# Patient Record
Sex: MALE | Race: BLACK OR AFRICAN AMERICAN | NOT HISPANIC OR LATINO | Employment: OTHER | URBAN - METROPOLITAN AREA
[De-identification: names, ages, dates, MRNs, and addresses within clinical notes are randomized per-mention and may not be internally consistent; named-entity substitution may affect disease eponyms.]

---

## 2019-01-31 ENCOUNTER — HOSPITAL ENCOUNTER (EMERGENCY)
Facility: HOSPITAL | Age: 64
Discharge: HOME | End: 2019-01-31
Attending: EMERGENCY MEDICINE
Payer: MEDICARE

## 2019-01-31 VITALS
OXYGEN SATURATION: 100 % | DIASTOLIC BLOOD PRESSURE: 76 MMHG | HEIGHT: 72 IN | HEART RATE: 71 BPM | WEIGHT: 250 LBS | RESPIRATION RATE: 18 BRPM | TEMPERATURE: 98 F | SYSTOLIC BLOOD PRESSURE: 144 MMHG | BODY MASS INDEX: 33.86 KG/M2

## 2019-01-31 DIAGNOSIS — Z79.4 TYPE 2 DIABETES MELLITUS WITH HYPERGLYCEMIA, WITH LONG-TERM CURRENT USE OF INSULIN: Primary | ICD-10-CM

## 2019-01-31 DIAGNOSIS — M79.671 RIGHT FOOT PAIN: ICD-10-CM

## 2019-01-31 DIAGNOSIS — J40 BRONCHITIS: ICD-10-CM

## 2019-01-31 DIAGNOSIS — E11.65 TYPE 2 DIABETES MELLITUS WITH HYPERGLYCEMIA, WITH LONG-TERM CURRENT USE OF INSULIN: Primary | ICD-10-CM

## 2019-01-31 LAB
BILIRUBIN, POC UA: NEGATIVE
BLOOD, POC UA: ABNORMAL
CLARITY, POC UA: CLEAR
COLOR, POC UA: YELLOW
GLUCOSE, POC UA: ABNORMAL
KETONES, POC UA: ABNORMAL
LEUKOCYTE EST, POC UA: NEGATIVE
NITRITE, POC UA: NEGATIVE
PH UR STRIP: 7 [PH]
POCT GLUCOSE: 346 MG/DL (ref 70–110)
PROTEIN, POC UA: ABNORMAL
SPECIFIC GRAVITY, POC UA: 1.02
UROBILINOGEN, POC UA: 1 E.U./DL

## 2019-01-31 PROCEDURE — 99284 EMERGENCY DEPT VISIT MOD MDM: CPT | Mod: 25,ER

## 2019-01-31 RX ORDER — BENZONATATE 100 MG/1
100 CAPSULE ORAL EVERY 8 HOURS PRN
Qty: 20 CAPSULE | Refills: 0 | Status: SHIPPED | OUTPATIENT
Start: 2019-01-31

## 2019-01-31 RX ORDER — DICLOFENAC SODIUM 10 MG/G
2 GEL TOPICAL 4 TIMES DAILY PRN
Qty: 100 G | Refills: 0 | OUTPATIENT
Start: 2019-01-31 | End: 2020-10-11

## 2019-01-31 NOTE — ED PROVIDER NOTES
Encounter Date: 1/31/2019    SCRIBE #1 NOTE: I, Timmy Neville, am scribing for, and in the presence of,  Toussaint Battley, FNP. I have scribed the following portions of the note - Other sections scribed: HPI, ROS, PE.       History     Chief Complaint   Patient presents with    Foot Pain     pt c/o right sided foot pain for about 2 wks denies any injury. states that pain is on the bottom from of his foot.    Cough     pt states cough and congestion for about 2 wks as well with some fever at night.      This is a 63 y.o. male who presents to the ED with a complaint of right foot pain that began two weeks ago. Pt localizes his pain to the bottom of his right foot. Pt states that walking worsens his pain. Pt also complains of intermittent subjective fever, coughing, and nasal congestion for the past two weeks.       The history is provided by the patient. No  was used.   Foot Pain   This is a new problem. Episode onset: Two weeks ago  The problem has been gradually worsening. Pertinent negatives include no chest pain, no abdominal pain, no headaches and no shortness of breath. The symptoms are aggravated by walking. Nothing relieves the symptoms.   Cough   This is a new problem. Illness onset: Two weeks ago  The problem has been unchanged. Associated symptoms include sweats. Pertinent negatives include no chest pain, no headaches and no shortness of breath.     Review of patient's allergies indicates:   Allergen Reactions    Penicillins Rash     Past Medical History:   Diagnosis Date    Asthma     Diabetes mellitus     Hypertension     Renal disorder      Past Surgical History:   Procedure Laterality Date    CORONARY STENT PLACEMENT      gallstone removal      hemodialysis shunt right forearm       History reviewed. No pertinent family history.  Social History     Tobacco Use    Smoking status: Current Some Day Smoker     Types: Cigarettes   Substance Use Topics    Alcohol use: Yes      Comment: occasional    Drug use: No     Review of Systems   Constitutional: Positive for fever (Subjective).   HENT: Positive for congestion.    Respiratory: Positive for cough. Negative for shortness of breath.    Cardiovascular: Negative for chest pain.   Gastrointestinal: Negative for abdominal pain.   Musculoskeletal: Positive for arthralgias.   Neurological: Negative for headaches.   All other systems reviewed and are negative.      Physical Exam     Initial Vitals [01/31/19 1549]   BP Pulse Resp Temp SpO2   (!) 117/91 76 18 97.6 °F (36.4 °C) 100 %      MAP       --         Physical Exam    Nursing note and vitals reviewed.  Constitutional: He appears well-developed and well-nourished.   HENT:   Head: Normocephalic and atraumatic.   Eyes: Conjunctivae are normal.   Neck: Normal range of motion. Neck supple.   Cardiovascular: Normal rate, regular rhythm, normal heart sounds and intact distal pulses. Exam reveals no gallop and no friction rub.    No murmur heard.  Pulmonary/Chest: Breath sounds normal. No respiratory distress. He has no wheezes. He has no rhonchi. He has no rales. He exhibits no tenderness.   Musculoskeletal: Normal range of motion.   Neurological: He is alert and oriented to person, place, and time.   Skin: Skin is warm and dry.   Psychiatric: He has a normal mood and affect. His behavior is normal.         ED Course   Procedures  Labs Reviewed   POCT URINALYSIS W/O SCOPE - Abnormal; Notable for the following components:       Result Value    Glucose, UA 2+ (*)     Bilirubin, UA Negative (*)     Ketones, UA Trace (*)     Blood, UA 2+ (*)     Protein, UA 3+ (*)     Nitrite, UA Negative (*)     Leukocytes, UA Negative (*)     All other components within normal limits   POCT GLUCOSE - Abnormal; Notable for the following components:    POCT Glucose 346 (*)     All other components within normal limits   POCT URINALYSIS W/O SCOPE   POCT GLUCOSE, HAND-HELD DEVICE          Imaging Results           X-Ray Foot Complete Right (Final result)  Result time 01/31/19 16:44:09    Final result by Tyrone Joseph MD (01/31/19 16:44:09)                 Impression:      No evidence for acute fracture, bone destruction, or dislocation.    Metallic soft tissue foreign bodies identified at the plantar aspect of the base of the great toe.    Prominent calcaneal spur at the insertion of the plantar fascia.    Atherosclerosis.      Electronically signed by: Tyrone Joseph MD  Date:    01/31/2019  Time:    16:44             Narrative:    EXAMINATION:  XR FOOT COMPLETE 3 VIEW RIGHT    CLINICAL HISTORY:  . Pain in right foot    TECHNIQUE:  AP, lateral, and oblique views of the right foot were performed.    COMPARISON:  None    FINDINGS:  The bones are intact.  There is no evidence for any acute fracture or bone destruction.  There is no evidence for dislocation.  There is a prominent calcaneal spur at the insertion of the plantar fascia.  There are multiple punctate metallic soft tissue foreign bodies identified at the plantar aspect of the base of the right great toe.  Atherosclerotic calcification is identified within the arteries of the foot.                               X-Ray Chest PA And Lateral (Final result)  Result time 01/31/19 16:31:20    Final result by Rell Gallegos MD (01/31/19 16:31:20)                 Impression:      No detrimental change or radiographic acute intrathoracic process seen.      Electronically signed by: Rell Gallegos MD  Date:    01/31/2019  Time:    16:31             Narrative:    EXAMINATION:  XR CHEST PA AND LATERAL    CLINICAL HISTORY:  Bronchitis, not specified as acute or chronic    TECHNIQUE:  PA and lateral views of the chest were performed.    COMPARISON:  Chest radiograph 03/28/2009    FINDINGS:  Interval median sternotomy.  Cardiomediastinal silhouette is midline and within normal limits for age.  Pulmonary vasculature and hilar regions are within normal limits.  The lungs are  symmetrically well expanded and clear.  No pleural effusion or pneumothorax.  Osseous structures appear intact.                                 Medical Decision Making:   Initial Assessment:   Left foot pain, bronchitis, hyperglycemia  Differential Diagnosis:   Pneumonia, fracture, decay  Clinical Tests:   Lab Tests: Ordered  Radiological Study: Ordered  ED Management:  Patient stated he did not wish to be worked up for DKA but would rather go home and take his insulin as prescribed and follow up with his primary care provider.  Patient be discharged home on Voltaren gel for his foot and Tessalon Perles for his bronchitis with instructions to follow up with his primary care provider tomorrow, return to the ER as needed if symptoms worsen or fail to improve, rest, refrain from strenuous activity, and implement  RICE for foot pain.  Patient verbalized understanding of discharge instructions removed under            Scribe Attestation:   Scribe #1: I performed the above scribed service and the documentation accurately describes the services I performed. I attest to the accuracy of the note.               Clinical Impression:     1. Type 2 diabetes mellitus with hyperglycemia, with long-term current use of insulin    2. Right foot pain    3. Bronchitis                                   Toussaint Battley III, [facility]  01/31/19 7522

## 2019-05-15 ENCOUNTER — HOSPITAL ENCOUNTER (EMERGENCY)
Facility: HOSPITAL | Age: 64
Discharge: HOME | End: 2019-05-15
Attending: EMERGENCY MEDICINE
Payer: MEDICARE

## 2019-05-15 VITALS
RESPIRATION RATE: 19 BRPM | DIASTOLIC BLOOD PRESSURE: 88 MMHG | OXYGEN SATURATION: 100 % | SYSTOLIC BLOOD PRESSURE: 132 MMHG | TEMPERATURE: 98 F | WEIGHT: 250 LBS | HEART RATE: 92 BPM | BODY MASS INDEX: 33.86 KG/M2 | HEIGHT: 72 IN

## 2019-05-15 DIAGNOSIS — Z79.4 TYPE 2 DIABETES MELLITUS WITH COMPLICATION, WITH LONG-TERM CURRENT USE OF INSULIN: ICD-10-CM

## 2019-05-15 DIAGNOSIS — K59.00 CONSTIPATION, UNSPECIFIED CONSTIPATION TYPE: Primary | ICD-10-CM

## 2019-05-15 DIAGNOSIS — E11.8 TYPE 2 DIABETES MELLITUS WITH COMPLICATION, WITH LONG-TERM CURRENT USE OF INSULIN: ICD-10-CM

## 2019-05-15 DIAGNOSIS — R10.9 ABDOMINAL PAIN: ICD-10-CM

## 2019-05-15 LAB
ALBUMIN SERPL-MCNC: 3.1 G/DL (ref 3.3–5.5)
ALBUMIN SERPL-MCNC: 3.3 G/DL (ref 3.3–5.5)
ALP SERPL-CCNC: 142 U/L (ref 42–141)
ALP SERPL-CCNC: 151 U/L (ref 42–141)
BILIRUB SERPL-MCNC: 0.7 MG/DL (ref 0.2–1.6)
BILIRUB SERPL-MCNC: 0.7 MG/DL (ref 0.2–1.6)
BUN SERPL-MCNC: 24 MG/DL (ref 7–22)
CALCIUM SERPL-MCNC: 9.2 MG/DL (ref 8–10.3)
CHLORIDE SERPL-SCNC: 95 MMOL/L (ref 98–108)
CREAT SERPL-MCNC: 6.8 MG/DL (ref 0.6–1.2)
GLUCOSE SERPL-MCNC: 482 MG/DL (ref 73–118)
POC ALT (SGPT): 21 U/L (ref 10–47)
POC ALT (SGPT): 22 U/L (ref 10–47)
POC AMYLASE: 87 U/L (ref 14–97)
POC AST (SGOT): 35 U/L (ref 11–38)
POC AST (SGOT): 36 U/L (ref 11–38)
POC GGT: 164 U/L (ref 5–65)
POC TCO2: 32 MMOL/L (ref 18–33)
POCT GLUCOSE: 342 MG/DL (ref 70–110)
POCT GLUCOSE: 462 MG/DL (ref 70–110)
POCT GLUCOSE: 498 MG/DL (ref 70–110)
POTASSIUM BLD-SCNC: 3.7 MMOL/L (ref 3.6–5.1)
PROTEIN, POC: 7.3 G/DL (ref 6.4–8.1)
PROTEIN, POC: 7.3 G/DL (ref 6.4–8.1)
SODIUM BLD-SCNC: 141 MMOL/L (ref 128–145)

## 2019-05-15 PROCEDURE — 80053 COMPREHEN METABOLIC PANEL: CPT | Mod: ER

## 2019-05-15 PROCEDURE — 99284 EMERGENCY DEPT VISIT MOD MDM: CPT | Mod: 25,ER

## 2019-05-15 PROCEDURE — 96374 THER/PROPH/DIAG INJ IV PUSH: CPT | Mod: ER

## 2019-05-15 PROCEDURE — 25000003 PHARM REV CODE 250: Mod: ER | Performed by: NURSE PRACTITIONER

## 2019-05-15 PROCEDURE — 63600175 PHARM REV CODE 636 W HCPCS: Mod: ER | Performed by: NURSE PRACTITIONER

## 2019-05-15 PROCEDURE — 85025 COMPLETE CBC W/AUTO DIFF WBC: CPT | Mod: ER

## 2019-05-15 PROCEDURE — 25000003 PHARM REV CODE 250: Mod: ER | Performed by: EMERGENCY MEDICINE

## 2019-05-15 PROCEDURE — 82150 ASSAY OF AMYLASE: CPT | Mod: ER

## 2019-05-15 PROCEDURE — 96361 HYDRATE IV INFUSION ADD-ON: CPT | Mod: ER

## 2019-05-15 PROCEDURE — 96372 THER/PROPH/DIAG INJ SC/IM: CPT | Mod: ER,59

## 2019-05-15 RX ORDER — POTASSIUM CHLORIDE 20 MEQ/1
40 TABLET, EXTENDED RELEASE ORAL
Status: COMPLETED | OUTPATIENT
Start: 2019-05-15 | End: 2019-05-15

## 2019-05-15 RX ORDER — INSULIN ASPART 100 [IU]/ML
1-10 INJECTION, SOLUTION INTRAVENOUS; SUBCUTANEOUS
Status: COMPLETED | OUTPATIENT
Start: 2019-05-15 | End: 2019-05-15

## 2019-05-15 RX ADMIN — POTASSIUM CHLORIDE 40 MEQ: 1500 TABLET, EXTENDED RELEASE ORAL at 06:05

## 2019-05-15 RX ADMIN — INSULIN ASPART 10 UNITS: 100 INJECTION, SOLUTION INTRAVENOUS; SUBCUTANEOUS at 07:05

## 2019-05-15 RX ADMIN — INSULIN HUMAN 6 UNITS: 100 INJECTION, SOLUTION PARENTERAL at 08:05

## 2019-05-15 RX ADMIN — SODIUM CHLORIDE 500 ML: 0.9 INJECTION, SOLUTION INTRAVENOUS at 08:05

## 2019-05-16 NOTE — ED PROVIDER NOTES
"Encounter Date: 5/15/2019       History     Chief Complaint   Patient presents with    Abdominal Pain     abd pain, onset "weeks", denies n/v/d or constipation, denies burning with urination.  dialysis pt- m/w/f- completed tx today     A 63-year-old male with asthma, diabetes, hypertension and chronic kidney disease currently on dialysis which presents with a several month history of worsening abdominal pain. Patient states that he goes to dialysis 3 times a day and he has not discussed this abdominal pain with the providers at the dialysis center.  He has not followed up with his primary care regarding this abdominal pain either.  Patient denies any fever, nausea, vomiting or diarrhea.  Patient states that he has 1-2 bowel movements today.  He denies any hardness of the bowel movements.  Patient denies any leg swelling.    The history is provided by the patient.     Review of patient's allergies indicates:   Allergen Reactions    Penicillins Rash     Past Medical History:   Diagnosis Date    Asthma     Diabetes mellitus     Hypertension     Renal disorder      Past Surgical History:   Procedure Laterality Date    CORONARY STENT PLACEMENT      gallstone removal      hemodialysis shunt right forearm       History reviewed. No pertinent family history.  Social History     Tobacco Use    Smoking status: Current Some Day Smoker     Types: Cigarettes   Substance Use Topics    Alcohol use: Yes     Comment: occasional    Drug use: No     Review of Systems   Constitutional: Negative for chills and fever.   HENT: Negative for sore throat.    Respiratory: Negative for shortness of breath.    Cardiovascular: Negative for chest pain.   Gastrointestinal: Positive for abdominal pain (Generalized). Negative for constipation, diarrhea, nausea and vomiting.   Genitourinary: Negative for dysuria.   Musculoskeletal: Negative for back pain.   Skin: Negative for rash.   Neurological: Negative for weakness.   Hematological: " Does not bruise/bleed easily.   All other systems reviewed and are negative.    Physical Exam     Initial Vitals [05/15/19 1648]   BP Pulse Resp Temp SpO2   (!) 136/91 92 19 98.2 °F (36.8 °C) 100 %      MAP       --         Physical Exam    Nursing note and vitals reviewed.  Constitutional: He appears well-developed and well-nourished.   HENT:   Head: Normocephalic and atraumatic.   Nose: Nose normal.   Mouth/Throat: Oropharynx is clear and moist.   Eyes: Conjunctivae and EOM are normal. Pupils are equal, round, and reactive to light.   Cardiovascular: Normal rate, regular rhythm, normal heart sounds and intact distal pulses. Exam reveals no gallop and no friction rub.    No murmur heard.  No lower extremity edema noted   Pulmonary/Chest: Breath sounds normal. No respiratory distress. He has no wheezes. He has no rhonchi. He has no rales. He exhibits no tenderness.   Abdominal: Soft. Bowel sounds are normal. He exhibits distension (Protuberant abdomen consistent with obesity). He exhibits no mass. There is no tenderness. There is no rebound and no guarding.   Musculoskeletal: Normal range of motion. He exhibits no edema or tenderness.   Neurological: He is alert and oriented to person, place, and time. He has normal strength.   Skin:   Fistula noted to right forearm without complication       ED Course   Procedures  Labs Reviewed   POCT LIVER PANEL - Abnormal; Notable for the following components:       Result Value    Alkaline Phosphatase,  (*)     POC  (*)     All other components within normal limits   POCT CMP - Abnormal; Notable for the following components:    Albumin, POC 3.1 (*)     Alkaline Phosphatase,  (*)     POC BUN 24 (*)     POC Chloride 95 (*)     POC Creatinine 6.8 (*)     POC Glucose 482 (*)     All other components within normal limits   POCT GLUCOSE - Abnormal; Notable for the following components:    POCT Glucose 498 (*)     All other components within normal limits   POCT  GLUCOSE - Abnormal; Notable for the following components:    POCT Glucose 462 (*)     All other components within normal limits   POCT GLUCOSE - Abnormal; Notable for the following components:    POCT Glucose 342 (*)     All other components within normal limits   POCT CBC   POCT GLUCOSE, HAND-HELD DEVICE   POCT GLUCOSE, HAND-HELD DEVICE   POCT GLUCOSE, HAND-HELD DEVICE   POCT CMP   POCT AMYLASE          Imaging Results          X-Ray Abdomen Flat And Erect (Final result)  Result time 05/15/19 17:20:46    Final result by Rajendra Falk MD (05/15/19 17:20:46)                 Impression:      1. Nonobstructive bowel gas pattern.  Moderate stool in the colon.      Electronically signed by: Rajendra Falk MD  Date:    05/15/2019  Time:    17:20             Narrative:    EXAMINATION:  XR ABDOMEN FLAT AND ERECT    CLINICAL HISTORY:  Unspecified abdominal pain    TECHNIQUE:  Flat and erect AP views of the abdomen were performed.    COMPARISON:  None    FINDINGS:  Two upright views, 3 supine views.    No significant air-fluid levels on upright view.  Air and stool is seen within the large bowel and projected over the rectum.  No focally dilated small bowel loops.  No large volume free air or pneumatosis.  There are no calcifications to suggest nephrolithiasis or cholelithiasis.  No acute osseous abnormality.                                 Medical Decision Making:   Initial Assessment:   63-year-old male with asthma, diabetes, hypertension and chronic kidney disease currently on dialysis which presents with a several month history of worsening abdominal pain. Patient states that he goes to dialysis 3 times a day and he has not discussed this abdominal pain with the providers at the dialysis center.  He has not followed up with his primary care regarding this abdominal pain either.  Patient denies any fever, nausea, vomiting or diarrhea.  Patient states that he has 1-2 bowel movements today.  He denies any hardness of  the bowel movements.  Patient denies any leg swelling.    Differential Diagnosis:   Constipation, ascites, DKA  Clinical Tests:   Lab Tests: Ordered and Reviewed       <> Summary of Lab: Patient noted to be hyperglycemic  Radiological Study: Ordered and Reviewed  ED Management:  Patient examined and has unremarkable exam.  Patient states that the abdominal pain is very vague and does not prevent him from doing things.  Patient's x-ray is consistent with constipation.  Patient advised of these findings and advised to take MiraLax along with talking to his nephrologist about when he can take for constipation.  Patient's labs indicated a hyperglycemic state.  Patient did not initially want any IV fluids or an IV to help treat the hyperglycemia.  Patient states that he only takes his insulin once a day it appears to be noncompliant with his medication regimen.  Patient given 10 units of insulin SQ while in ED which did not decrease the blood sugar.  Patient finally agreed to get an IV, fluids and IV insulin.  Upon completing this his sugar decreased to 342 and the patient was discharged.  Patient was advised to follow up with his primary care in the next couple days for further management of his diabetes.  Patient also advised to follow a better diet.  Patient given strict return precautions and voiced understanding of all discharge instructions.  Patient stable at discharge.                   ED Course as of May 15 2147   Wed May 15, 2019   1656 BP(!): 136/91 [AT]   1656 Temp: 98.2 °F (36.8 °C) [AT]   1656 Temp src: Oral [AT]   1656 Pulse: 92 [AT]   1656 Resp: 19 [AT]   1656 SpO2: 100 % [AT]   1846 POC BUN(!): 24 [AT]   1846 POC Glucose(!!): 482 [AT]   1846 Alkaline Phosphatase, POC(!): 142 [AT]   1846 POC GGT(!): 164 [AT]   2113 Originally patient did not want an IV and fluids for his elevated blood sugar-after a couple of hours and his wife convinced him to get the IV and fluids patient agreed-patient was also given  IV insulin for which his sugar decreased to 342    [AT]   2114 POCT Glucose(!): 342 [AT]      ED Course User Index  [AT] CLINT Bustillos     Clinical Impression:       ICD-10-CM ICD-9-CM   1. Constipation, unspecified constipation type K59.00 564.00   2. Abdominal pain R10.9 789.00   3. Type 2 diabetes mellitus with complication, with long-term current use of insulin E11.8 250.90    Z79.4 V58.67                                CLINT Bustillos  05/15/19 2156

## 2019-05-16 NOTE — ED NOTES
IV discontinued per discharge order. IV catheter intact. No irritation, or phlebitis noted. No complaints voiced from pt during removal. Gauze with coban applied. Bleeding controlled on discharge.

## 2020-01-04 ENCOUNTER — HOSPITAL ENCOUNTER (EMERGENCY)
Facility: HOSPITAL | Age: 65
Discharge: HOME | End: 2020-01-04
Attending: EMERGENCY MEDICINE
Payer: MEDICAID

## 2020-01-04 VITALS
WEIGHT: 250 LBS | RESPIRATION RATE: 18 BRPM | BODY MASS INDEX: 33.86 KG/M2 | TEMPERATURE: 99 F | SYSTOLIC BLOOD PRESSURE: 141 MMHG | DIASTOLIC BLOOD PRESSURE: 74 MMHG | OXYGEN SATURATION: 100 % | HEIGHT: 72 IN | HEART RATE: 89 BPM

## 2020-01-04 DIAGNOSIS — R21 RASH: ICD-10-CM

## 2020-01-04 DIAGNOSIS — G89.29 CHRONIC PAIN OF LEFT LOWER EXTREMITY: Primary | ICD-10-CM

## 2020-01-04 DIAGNOSIS — M79.605 CHRONIC PAIN OF LEFT LOWER EXTREMITY: Primary | ICD-10-CM

## 2020-01-04 LAB
BILIRUBIN, POC UA: NEGATIVE
BLOOD, POC UA: ABNORMAL
CLARITY, POC UA: CLEAR
COLOR, POC UA: YELLOW
GLUCOSE, POC UA: NEGATIVE
KETONES, POC UA: NEGATIVE
LEUKOCYTE EST, POC UA: NEGATIVE
NITRITE, POC UA: NEGATIVE
PH UR STRIP: 5.5 [PH]
POCT GLUCOSE: 81 MG/DL (ref 70–110)
PROTEIN, POC UA: ABNORMAL
SPECIFIC GRAVITY, POC UA: 1.02
UROBILINOGEN, POC UA: 0.2 E.U./DL

## 2020-01-04 PROCEDURE — 81003 URINALYSIS AUTO W/O SCOPE: CPT | Mod: ER

## 2020-01-04 PROCEDURE — 99284 EMERGENCY DEPT VISIT MOD MDM: CPT | Mod: 25,ER

## 2020-01-04 PROCEDURE — 25000003 PHARM REV CODE 250: Mod: ER | Performed by: EMERGENCY MEDICINE

## 2020-01-04 PROCEDURE — 82962 GLUCOSE BLOOD TEST: CPT | Mod: ER

## 2020-01-04 RX ORDER — NYSTATIN 100000 U/G
CREAM TOPICAL 3 TIMES DAILY
Qty: 30 G | Refills: 0 | Status: SHIPPED | OUTPATIENT
Start: 2020-01-04 | End: 2020-01-14

## 2020-01-04 RX ORDER — ACETAMINOPHEN 500 MG
500 TABLET ORAL EVERY 6 HOURS PRN
Qty: 30 TABLET | Refills: 0 | OUTPATIENT
Start: 2020-01-04 | End: 2020-07-06

## 2020-01-04 RX ORDER — ACETAMINOPHEN 325 MG/1
650 TABLET ORAL
Status: COMPLETED | OUTPATIENT
Start: 2020-01-04 | End: 2020-01-04

## 2020-01-04 RX ORDER — CYCLOBENZAPRINE HCL 10 MG
10 TABLET ORAL 3 TIMES DAILY PRN
Qty: 15 TABLET | Refills: 0 | Status: SHIPPED | OUTPATIENT
Start: 2020-01-04 | End: 2020-01-09

## 2020-01-04 RX ADMIN — ACETAMINOPHEN 650 MG: 325 TABLET, FILM COATED ORAL at 01:01

## 2020-01-04 NOTE — ED PROVIDER NOTES
"Encounter Date: 1/4/2020    SCRIBE #1 NOTE: I, Natan Sam, am scribing for, and in the presence of,  Dr. Carmona . I have scribed the following portions of the note - Other sections scribed: HPI, ROS, PE.       History     Chief Complaint   Patient presents with    Leg Pain     left leg pain for "a while", pt report pain on the right side of the testicle, pt also have complaints of right sided dental pain.  note pt is on dialysis. access is in the right FA.     Dental Pain    Testicle Pain     64 y.o. male with chronic left sided lower leg pain years, recently worsening. Patient also reports groin and burning sensation to skin area near testicles. Denies chest pain, abdominal pain, headache, or any other symptoms at this time. Patient is on dialysis and access Is in right FA. Patient does make urine     The history is provided by the patient.     Review of patient's allergies indicates:   Allergen Reactions    Lisinopril Other (See Comments)     DIARRHEA    Penicillins Rash     Past Medical History:   Diagnosis Date    Asthma     Diabetes mellitus     Hypertension     Renal disorder      Past Surgical History:   Procedure Laterality Date    CORONARY STENT PLACEMENT      gallstone removal      hemodialysis shunt right forearm       No family history on file.  Social History     Tobacco Use    Smoking status: Current Some Day Smoker     Types: Cigarettes   Substance Use Topics    Alcohol use: Yes     Comment: occasional    Drug use: No     Review of Systems   Constitutional: Negative for fever.   Cardiovascular: Negative for chest pain.   Genitourinary: Positive for testicular pain (skin area).   Musculoskeletal: Positive for arthralgias.   Skin: Negative for rash.   Neurological: Negative for headaches.   All other systems reviewed and are negative.      Physical Exam     Initial Vitals   BP Pulse Resp Temp SpO2   01/04/20 1150 01/04/20 1134 01/04/20 1134 01/04/20 1134 01/04/20 1134   104/78 89 " 16 97.9 °F (36.6 °C) 99 %      MAP       --                Patient gave consent to have physical exam performed.    Physical Exam    Nursing note and vitals reviewed.  Constitutional: Vital signs are normal. He appears well-developed and well-nourished.   HENT:   Head: Normocephalic and atraumatic.   Right Ear: External ear normal.   Left Ear: External ear normal.   Nose: Nose normal.   Mouth/Throat: Oropharynx is clear and moist.   Eyes: EOM are normal. Pupils are equal, round, and reactive to light.   Neck: Normal range of motion. Neck supple.   Cardiovascular: Normal rate, regular rhythm and normal heart sounds. Exam reveals no gallop and no friction rub.    No murmur heard.  Pulmonary/Chest: Effort normal and breath sounds normal. No respiratory distress. He has no decreased breath sounds. He has no wheezes. He has no rhonchi. He has no rales.   Abdominal: Soft. Bowel sounds are normal. He exhibits no distension and no mass. There is no tenderness. There is no rebound and no guarding.   Musculoskeletal: Normal range of motion. He exhibits no edema or tenderness.   Pulse is palpable x4 no clubbing cyanosis or edema.  No bony tenderness appreciated on exam   Neurological: He is alert and oriented to person, place, and time. He has normal strength.   Skin: Skin is warm and dry. Capillary refill takes less than 2 seconds. Rash (Mild erythema to crease of right groin, area is very moist) noted.   Psychiatric: He has a normal mood and affect.         ED Course   Procedures  Labs Reviewed   POCT URINALYSIS W/O SCOPE - Abnormal; Notable for the following components:       Result Value    Blood, UA 3+ (*)     Protein, UA 3+ (*)     All other components within normal limits   POCT URINALYSIS W/O SCOPE   POCT GLUCOSE               Medical Decision Making:   History:   Old Medical Records: I decided to obtain old medical records.  Clinical Tests:   Lab Tests: Ordered and Reviewed  Chief complaint: Left leg pain    Differential diagnosis:  Chronic left leg pain, hyperglycemia, urinary tract infection, hypoglycemia, groin pain, and rash  Treatment in the ED; included physical exam and Tylenol  Patient reports feeling better after treatment in the ER.      Ordered POCT urinalysis, POCT glucose   Discussed treatment, prescriptions, and labs.  Fill and take prescriptions as directed.  Return to the ED if symptoms worsen or do not resolve.   Answered questions and discussed discharge plan.    Patient feels better and is ready for discharge.  Follow up with PCP/specialist in 1 day.            Scribe Attestation:   Scribe #1: I performed the above scribed service and the documentation accurately describes the services I performed. I attest to the accuracy of the note.     I, Dr. Elin Carmona, personally performed the services described in this documentation. This document was produced by a scribe under my direction and in my presence. All medical record entries made by the scribe were at my direction and in my presence.  I have reviewed the chart and agree that the record reflects my personal performance and is accurate and complete. Elin Carmona, .     01/04/2020 1:04 PM                        Clinical Impression:     1. Chronic pain of left lower extremity    2. Tal Carmona,   01/04/20 3940

## 2020-07-06 ENCOUNTER — HOSPITAL ENCOUNTER (EMERGENCY)
Facility: HOSPITAL | Age: 65
Discharge: HOME | End: 2020-07-06
Attending: EMERGENCY MEDICINE
Payer: MEDICARE

## 2020-07-06 VITALS
RESPIRATION RATE: 16 BRPM | WEIGHT: 200 LBS | HEIGHT: 72 IN | BODY MASS INDEX: 27.09 KG/M2 | DIASTOLIC BLOOD PRESSURE: 69 MMHG | SYSTOLIC BLOOD PRESSURE: 149 MMHG | OXYGEN SATURATION: 98 % | TEMPERATURE: 98 F | HEART RATE: 75 BPM

## 2020-07-06 DIAGNOSIS — Z99.2 ESRD (END STAGE RENAL DISEASE) ON DIALYSIS: Primary | ICD-10-CM

## 2020-07-06 DIAGNOSIS — R34 OLIGURIA: ICD-10-CM

## 2020-07-06 DIAGNOSIS — N18.6 ESRD (END STAGE RENAL DISEASE) ON DIALYSIS: Primary | ICD-10-CM

## 2020-07-06 LAB
ALBUMIN SERPL-MCNC: 3.3 G/DL (ref 3.3–5.5)
ALP SERPL-CCNC: 142 U/L (ref 42–141)
BILIRUB SERPL-MCNC: 0.4 MG/DL (ref 0.2–1.6)
BILIRUBIN, POC UA: NEGATIVE
BLOOD, POC UA: ABNORMAL
BUN SERPL-MCNC: 40 MG/DL (ref 7–22)
CALCIUM SERPL-MCNC: 10.2 MG/DL (ref 8–10.3)
CHLORIDE SERPL-SCNC: 99 MMOL/L (ref 98–108)
CLARITY, POC UA: CLEAR
COLOR, POC UA: YELLOW
CREAT SERPL-MCNC: 9.9 MG/DL (ref 0.6–1.2)
GLUCOSE SERPL-MCNC: 104 MG/DL (ref 73–118)
GLUCOSE, POC UA: NEGATIVE
KETONES, POC UA: NEGATIVE
LEUKOCYTE EST, POC UA: ABNORMAL
NITRITE, POC UA: NEGATIVE
PH UR STRIP: 7.5 [PH]
POC ALT (SGPT): 23 U/L (ref 10–47)
POC AST (SGOT): 31 U/L (ref 11–38)
POC TCO2: 26 MMOL/L (ref 18–33)
POTASSIUM BLD-SCNC: 4.8 MMOL/L (ref 3.6–5.1)
PROTEIN, POC UA: ABNORMAL
PROTEIN, POC: 9.2 G/DL (ref 6.4–8.1)
SODIUM BLD-SCNC: 143 MMOL/L (ref 128–145)
SPECIFIC GRAVITY, POC UA: 1.02
UROBILINOGEN, POC UA: 0.2 E.U./DL

## 2020-07-06 PROCEDURE — 81003 URINALYSIS AUTO W/O SCOPE: CPT | Mod: ER

## 2020-07-06 PROCEDURE — 99284 EMERGENCY DEPT VISIT MOD MDM: CPT | Mod: 25,ER

## 2020-07-06 PROCEDURE — 25000003 PHARM REV CODE 250: Mod: ER | Performed by: PHYSICIAN ASSISTANT

## 2020-07-06 PROCEDURE — 85025 COMPLETE CBC W/AUTO DIFF WBC: CPT | Mod: ER

## 2020-07-06 PROCEDURE — 80053 COMPREHEN METABOLIC PANEL: CPT | Mod: ER

## 2020-07-06 RX ORDER — OXYCODONE AND ACETAMINOPHEN 5; 325 MG/1; MG/1
1 TABLET ORAL
Status: COMPLETED | OUTPATIENT
Start: 2020-07-06 | End: 2020-07-06

## 2020-07-06 RX ORDER — DOXYCYCLINE 100 MG/1
100 CAPSULE ORAL EVERY 12 HOURS
Qty: 20 CAPSULE | Refills: 0 | Status: SHIPPED | OUTPATIENT
Start: 2020-07-06 | End: 2020-07-16

## 2020-07-06 RX ORDER — ACETAMINOPHEN 500 MG
500 TABLET ORAL EVERY 4 HOURS PRN
Qty: 20 TABLET | Refills: 0 | Status: SHIPPED | OUTPATIENT
Start: 2020-07-06 | End: 2020-07-11

## 2020-07-06 RX ORDER — LIDOCAINE 50 MG/G
1 PATCH TOPICAL DAILY
Qty: 15 PATCH | Refills: 0 | Status: SHIPPED | OUTPATIENT
Start: 2020-07-06 | End: 2020-07-21

## 2020-07-06 RX ADMIN — OXYCODONE HYDROCHLORIDE AND ACETAMINOPHEN 1 TABLET: 5; 325 TABLET ORAL at 04:07

## 2020-07-06 NOTE — ED TRIAGE NOTES
Pt to er c/o LUQ and L groin pain with urinary retention and dysuria--denies N/V, fever, bleeding, cough, CP, trauma, and CP--NAD

## 2020-07-06 NOTE — DISCHARGE INSTRUCTIONS
Take Tylenol as needed for pain. Put Lidoderm on your left side for pain as needed. Follow up with primary care in 2 days. Return to ER for worsening symptoms or as needed

## 2020-07-06 NOTE — ED PROVIDER NOTES
"Encounter Date: 7/6/2020    SCRIBE #1 NOTE: I, Elida Brady, am scribing for, and in the presence of,  EVA Baer. I have scribed the following portions of the note - Other sections scribed: HPI, ROS, PE.       History     Chief Complaint   Patient presents with    Groin Pain     x 1 day with difficulty urinating. "feels like I have to pee but it won't come out."     Jameson Link is a 64 y.o. male with history of Diabetes, ESRD on dialysis MWF who presents to the ED with evaluation of difficulty urinating that began this morning. Patient states he has never had similar episodes previously. He went to dialysis this morning and states he drank little water yesterday because he was going to dialysis this morning. Reports intermittent left flank pain for the past month. Review of chart shows he had CT without contrast on 6/8/2020 that was negative with no acute abnormalities. Patient has developed right-sided penile pain this morning that is worse when he attempts to urinate. Denies fever, chills, nausea, vomiting, diarrhea, abdominal pain, penile discharge or swelling, testicular pain or swelling, dysuria, hematuria, urgency, or frequency. No attempted treatment.    The history is provided by the patient. No  was used.     Review of patient's allergies indicates:   Allergen Reactions    Lisinopril Other (See Comments)     DIARRHEA    Penicillins Rash     Past Medical History:   Diagnosis Date    Asthma     Diabetes mellitus     Hypertension     Renal disorder      Past Surgical History:   Procedure Laterality Date    CORONARY STENT PLACEMENT      gallstone removal      hemodialysis shunt right forearm       No family history on file.  Social History     Tobacco Use    Smoking status: Current Some Day Smoker     Types: Cigarettes   Substance Use Topics    Alcohol use: Yes     Comment: occasional    Drug use: No     Review of Systems   Constitutional: Negative for chills and " fever.   HENT: Negative for sore throat.    Eyes: Negative for redness.   Respiratory: Negative for shortness of breath.    Cardiovascular: Negative for chest pain.   Gastrointestinal: Negative for abdominal pain, diarrhea, nausea and vomiting.   Genitourinary: Positive for difficulty urinating, flank pain (left) and penile pain (right-sided). Negative for discharge, dysuria, frequency, hematuria, penile swelling, testicular pain and urgency.   Musculoskeletal: Negative for back pain.   Skin: Negative for rash.   Neurological: Negative for dizziness, weakness, light-headedness and headaches.   Hematological: Does not bruise/bleed easily.   Psychiatric/Behavioral: Negative for confusion.   All other systems reviewed and are negative.      Physical Exam     Initial Vitals [07/06/20 1109]   BP Pulse Resp Temp SpO2   135/89 73 20 97.6 °F (36.4 °C) 100 %      MAP       --         Physical Exam    Nursing note and vitals reviewed.  Constitutional: He appears well-developed.   HENT:   Head: Normocephalic and atraumatic.   Right Ear: External ear normal.   Left Ear: External ear normal.   Nose: Nose normal.   Mouth/Throat: Oropharynx is clear and moist.   Eyes: Conjunctivae and EOM are normal. Pupils are equal, round, and reactive to light.   Neck: Normal range of motion. Neck supple.   Cardiovascular: Normal rate, regular rhythm, S1 normal, S2 normal and normal heart sounds. Exam reveals no gallop and no friction rub.    No murmur heard.  Pulmonary/Chest: Breath sounds normal. No respiratory distress. He has no wheezes. He has no rhonchi. He has no rales.   Abdominal: Soft. There is no abdominal tenderness.   Bladder Scan: 0 mL.   Genitourinary:    Genitourinary Comments:  Exam performed by Dr. Diaz chaperoned by Lizzie VELASQUEZ,  with no testicular swelling, testicular tenderness, hernia, penile discharge or penile swelling.     Musculoskeletal: Normal range of motion.   Neurological: He is alert and oriented to  person, place, and time.   Skin: Skin is warm and dry. Capillary refill takes less than 2 seconds.   Psychiatric: He has a normal mood and affect. His behavior is normal.         ED Course   Procedures  Labs Reviewed   POCT URINALYSIS W/O SCOPE - Abnormal; Notable for the following components:       Result Value    Blood, UA 3+ (*)     Protein, UA 3+ (*)     Leukocytes, UA 3+ (*)     All other components within normal limits   POCT CMP - Abnormal; Notable for the following components:    Alkaline Phosphatase,  (*)     POC BUN 40 (*)     POC Creatinine 9.9 (*)     Protein 9.2 (*)     All other components within normal limits   POCT CBC   POCT URINALYSIS W/O SCOPE   POCT CMP              Imaging Results          CT Renal Stone Study ABD Pelvis WO (Final result)  Result time 07/06/20 13:51:37    Final result by Rell Gallegos MD (07/06/20 13:51:37)                 Impression:      No acute process or CT findings identified to explain patient's symptoms flank pain on this noncontrast CT.  Specifically, no convincing evidence of radiodense nephrolithiasis or ureteral calculus, noting a punctate nonspecific cortical calcification at the right renal upper pole.    Left adrenal small lipid rich adenoma.    Coronary and systemic atherosclerosis.    Few additional findings as above.      Electronically signed by: Rell Gallegos MD  Date:    07/06/2020  Time:    13:51             Narrative:    EXAMINATION:  CT RENAL STONE STUDY ABD PELVIS WO    CLINICAL HISTORY:  Flank pain, kidney stone suspected;    TECHNIQUE:  Low dose axial images, sagittal and coronal reformations were obtained from the lung bases to the pubic symphysis.  Contrast was not administered.    COMPARISON:  Abdominal series 05/15/2009    FINDINGS:  Imaged lung bases are clear.  Base of the heart is normal in size without significant pericardial fluid noting coronary arterial calcification and evidence of suspected prior CABG.  Lower sternotomy wires  noted.    Noncontrast appearance of the liver, gallbladder, pancreas, spleen, stomach, duodenum and right adrenal gland are within normal limits.  There is a 1.1 cm low-density nodule arising from the medial limb of the left adrenal gland with Hounsfield units most consistent with a lipid rich adenoma.  No biliary ductal dilatation.    Bilateral kidneys are normal in shape and location but overall somewhat small in size suggesting mild atrophy.  No hydronephrosis or significant perinephric stranding.  There is a punctate calcification which appears to located in the posterior cortex of the right renal upper pole.  No radiodense calculus seen within the collecting systems on either side or urinary bladder.  Subcentimeter hypoattenuating cortical foci at the lower pole right kidney and upper pole left kidneys which are too small to characterize.  Ureters are nondilated.  Urinary bladder is suboptimally distended.  Prostate and seminal vesicles are within normal limits.  Pelvic phleboliths noted.    No ascites, free air or lymphadenopathy.  Moderate to advanced scattered calcific atherosclerosis of the aorta and its proximal branch vessels.  Aorta tapers normally.    Tiny fat containing umbilical hernia.  Appendix and terminal ileum are within normal limits.  Mild amount of scattered fecal material throughout the colon.  No evidence of bowel obstruction or inflammation.  No pneumatosis or portal venous gas.    Osseous structures show age-related mild degenerative change without acute or destructive process seen.                                 Medical Decision Making:   History:   Old Medical Records: I decided to obtain old medical records.  Clinical Tests:   Lab Tests: Ordered and Reviewed  ED Management:  64-year-old male with history of end-stage renal disease on dialysis Monday Wednesday Friday and diabetes presenting for oliguria /and urea that began yesterday.  Patient states he last made urine yesterday  evening.  He went to dialysis this morning states he has been unable to urinate.  He states he does not have an urge to urinate.  Bladder scan performed in the ED shows 0 mL of urine.  BUN and creatinine increased from 40 and 24 from 9.9 and 6.8.  Patient reports he is up 1 month history of intermittent left flank pain.  CT renal is negative for nephrolithiasis. Pt was able to give small amount of urine in the ED. UA remarkable for 3+ leuks. Doxycycline prescribed for UTI.     Spoke with Dr. Hampton, nephrology, who agrees with outpatient follow up. Discussed pt with Dr. Diaz who agrees with assessment and plan.             Scribe Attestation:   Scribe #1: I performed the above scribed service and the documentation accurately describes the services I performed. I attest to the accuracy of the note.    Attending Attestation:           Physician Attestation for Scribe:  Physician Attestation Statement for Scribe #1: I, Lizzie Bunch PA-C, reviewed documentation, as scribed by Elida Franco in my presence, and it is both accurate and complete.                                Clinical Impression:     1. ESRD (end stage renal disease) on dialysis    2. Oliguria                ED Disposition Condition    Discharge Stable        ED Prescriptions     Medication Sig Dispense Start Date End Date Auth. Provider    acetaminophen (TYLENOL) 500 MG tablet Take 1 tablet (500 mg total) by mouth every 4 (four) hours as needed. 20 tablet 7/6/2020 7/11/2020 Lizzie Bunch PA-C    lidocaine (LIDODERM) 5 % Place 1 patch onto the skin once daily. Remove & Discard patch within 12 hours or as directed by MD. May use 4% over the counter if not covered by insurance for 15 days 15 patch 7/6/2020 7/21/2020 Lizzie Bunch PA-C    doxycycline (VIBRAMYCIN) 100 MG Cap Take 1 capsule (100 mg total) by mouth every 12 (twelve) hours. for 10 days 20 capsule 7/6/2020 7/16/2020 Lizzie Bunch PA-C        Follow-up Information     None                                    Lizzie Bunch PA-C  07/06/20 2119       Lizzie Bunch PA-C  07/06/20 2119

## 2020-07-31 ENCOUNTER — HOSPITAL ENCOUNTER (EMERGENCY)
Facility: HOSPITAL | Age: 65
Discharge: HOME | End: 2020-07-31
Attending: EMERGENCY MEDICINE
Payer: MEDICARE

## 2020-07-31 VITALS
BODY MASS INDEX: 27.09 KG/M2 | SYSTOLIC BLOOD PRESSURE: 135 MMHG | WEIGHT: 200 LBS | OXYGEN SATURATION: 95 % | HEIGHT: 72 IN | DIASTOLIC BLOOD PRESSURE: 80 MMHG | RESPIRATION RATE: 16 BRPM | HEART RATE: 84 BPM | TEMPERATURE: 98 F

## 2020-07-31 DIAGNOSIS — R10.12 LEFT UPPER QUADRANT ABDOMINAL PAIN OF UNKNOWN ETIOLOGY: ICD-10-CM

## 2020-07-31 DIAGNOSIS — N30.00 ACUTE CYSTITIS WITHOUT HEMATURIA: Primary | ICD-10-CM

## 2020-07-31 DIAGNOSIS — N50.819 TESTICULAR PAIN: ICD-10-CM

## 2020-07-31 LAB
ALBUMIN SERPL-MCNC: 3.3 G/DL (ref 3.3–5.5)
ALLENS TEST: ABNORMAL
ALP SERPL-CCNC: 166 U/L (ref 42–141)
BILIRUB SERPL-MCNC: 0.7 MG/DL (ref 0.2–1.6)
BILIRUBIN, POC UA: ABNORMAL
BLOOD, POC UA: ABNORMAL
BUN SERPL-MCNC: 23 MG/DL (ref 7–22)
CALCIUM SERPL-MCNC: 10.6 MG/DL (ref 8–10.3)
CHLORIDE SERPL-SCNC: 100 MMOL/L (ref 98–108)
CLARITY, POC UA: ABNORMAL
COLOR, POC UA: YELLOW
CREAT SERPL-MCNC: 7.7 MG/DL (ref 0.6–1.2)
GLUCOSE SERPL-MCNC: 163 MG/DL (ref 73–118)
GLUCOSE, POC UA: NEGATIVE
HCO3 UR-SCNC: 29.3 MMOL/L (ref 24–28)
KETONES, POC UA: NEGATIVE
LDH SERPL L TO P-CCNC: 2.37 MMOL/L (ref 0.5–2.2)
LEUKOCYTE EST, POC UA: ABNORMAL
NITRITE, POC UA: NEGATIVE
PCO2 BLDA: 44.7 MMHG (ref 35–45)
PH SMN: 7.42 [PH] (ref 7.35–7.45)
PH UR STRIP: 7.5 [PH]
PO2 BLDA: 25 MMHG (ref 40–60)
POC ALT (SGPT): 28 U/L (ref 10–47)
POC AST (SGOT): 32 U/L (ref 11–38)
POC BE: 4 MMOL/L
POC CARDIAC TROPONIN I: 0.04 NG/ML
POC SATURATED O2: 46 % (ref 95–100)
POC TCO2: 27 MMOL/L (ref 18–33)
POC TCO2: 31 MMOL/L (ref 24–29)
POCT GLUCOSE: 186 MG/DL (ref 70–110)
POTASSIUM BLD-SCNC: 4.3 MMOL/L (ref 3.6–5.1)
PROTEIN, POC UA: ABNORMAL
PROTEIN, POC: 8.6 G/DL (ref 6.4–8.1)
SAMPLE: ABNORMAL
SAMPLE: NORMAL
SITE: ABNORMAL
SODIUM BLD-SCNC: 139 MMOL/L (ref 128–145)
SPECIFIC GRAVITY, POC UA: 1.02
UROBILINOGEN, POC UA: 0.2 E.U./DL

## 2020-07-31 PROCEDURE — 81003 URINALYSIS AUTO W/O SCOPE: CPT | Mod: ER

## 2020-07-31 PROCEDURE — 93010 EKG 12-LEAD: ICD-10-PCS | Mod: ,,, | Performed by: INTERNAL MEDICINE

## 2020-07-31 PROCEDURE — 25000003 PHARM REV CODE 250: Mod: ER | Performed by: NURSE PRACTITIONER

## 2020-07-31 PROCEDURE — 96372 THER/PROPH/DIAG INJ SC/IM: CPT | Mod: ER

## 2020-07-31 PROCEDURE — 84484 ASSAY OF TROPONIN QUANT: CPT | Mod: ER

## 2020-07-31 PROCEDURE — 85025 COMPLETE CBC W/AUTO DIFF WBC: CPT | Mod: ER

## 2020-07-31 PROCEDURE — 82962 GLUCOSE BLOOD TEST: CPT | Mod: ER

## 2020-07-31 PROCEDURE — 99284 EMERGENCY DEPT VISIT MOD MDM: CPT | Mod: 25,ER

## 2020-07-31 PROCEDURE — 80053 COMPREHEN METABOLIC PANEL: CPT | Mod: ER

## 2020-07-31 PROCEDURE — 63600175 PHARM REV CODE 636 W HCPCS: Mod: ER | Performed by: NURSE PRACTITIONER

## 2020-07-31 PROCEDURE — 93010 ELECTROCARDIOGRAM REPORT: CPT | Mod: ,,, | Performed by: INTERNAL MEDICINE

## 2020-07-31 PROCEDURE — 93005 ELECTROCARDIOGRAM TRACING: CPT | Mod: ER

## 2020-07-31 PROCEDURE — 82803 BLOOD GASES ANY COMBINATION: CPT | Mod: ER

## 2020-07-31 RX ORDER — MORPHINE SULFATE 4 MG/ML
6 INJECTION, SOLUTION INTRAMUSCULAR; INTRAVENOUS
Status: COMPLETED | OUTPATIENT
Start: 2020-07-31 | End: 2020-07-31

## 2020-07-31 RX ORDER — MORPHINE SULFATE 4 MG/ML
6 INJECTION, SOLUTION INTRAMUSCULAR; INTRAVENOUS
Status: DISCONTINUED | OUTPATIENT
Start: 2020-07-31 | End: 2020-07-31

## 2020-07-31 RX ORDER — CIPROFLOXACIN 500 MG/1
500 TABLET ORAL DAILY
Qty: 10 TABLET | Refills: 0 | Status: SHIPPED | OUTPATIENT
Start: 2020-07-31 | End: 2020-08-10

## 2020-07-31 RX ORDER — DICYCLOMINE HYDROCHLORIDE 20 MG/1
20 TABLET ORAL
Qty: 28 TABLET | Refills: 0 | Status: SHIPPED | OUTPATIENT
Start: 2020-07-31 | End: 2020-08-07

## 2020-07-31 RX ORDER — CIPROFLOXACIN 500 MG/1
500 TABLET ORAL EVERY 24 HOURS
Status: DISCONTINUED | OUTPATIENT
Start: 2020-07-31 | End: 2020-07-31 | Stop reason: HOSPADM

## 2020-07-31 RX ADMIN — CIPROFLOXACIN 500 MG: 500 TABLET, FILM COATED ORAL at 12:07

## 2020-07-31 RX ADMIN — MORPHINE SULFATE 6 MG: 4 INJECTION, SOLUTION INTRAMUSCULAR; INTRAVENOUS at 11:07

## 2020-07-31 NOTE — ED TRIAGE NOTES
Patient states his testicles and penis hurt. Cannot state for how long. Denies penile drainage. Testicles are tender when NP Vernen palpated them.

## 2020-07-31 NOTE — ED NOTES
"Walked pt back into room Pt stating that his penis hurts. Pt prescribed abx and stated " I need more of these because this makes it feel better." Pt denies urinary complaints. Pt instructed into gown for MD evaluation pt stated " fuck man!"   "

## 2020-07-31 NOTE — ED PROVIDER NOTES
"Encounter Date: 7/31/2020    SCRIBE #1 NOTE: I, Queta Carreon, am scribing for, and in the presence of,  Dr. Carmona. I have scribed the following portions of the note - the EKG reading. Other sections scribed: HPI, ROS.       History     Chief Complaint   Patient presents with    refused to talk with triage nurse     PT handed triage nurse a bottle of Doxycyline and stated " i need a refill." when asked why medication was prescribed pt shouted " FUCK CANT YOU READ!". Pt refising to answer questions      This patient is a 64 y.o. male with a hx of ESRD on HD presenting to the ED with LUQ abdominal pain x 1 month and testicular pain. He reports testicular pain is in both testicles, denies difficulty urinating but Endorses dysuria. Denies changes in sexual function, testicular tenderness, penile d/c or lumps, bumps or lesions. Reports his antibiotics helped relieve his testicular pain. Denies N/V, fever, chills, CP and HA. Denies having unprotected sex.    The history is provided by the patient. No  was used.     Review of patient's allergies indicates:   Allergen Reactions    Lisinopril Other (See Comments)     DIARRHEA    Penicillins Rash     Past Medical History:   Diagnosis Date    Asthma     Diabetes mellitus     Hypertension     Renal disorder      Past Surgical History:   Procedure Laterality Date    CORONARY STENT PLACEMENT      gallstone removal      hemodialysis shunt right forearm       No family history on file.  Social History     Tobacco Use    Smoking status: Current Some Day Smoker     Types: Cigarettes   Substance Use Topics    Alcohol use: Yes     Comment: occasional    Drug use: No     Review of Systems   Constitutional: Negative for appetite change, chills, diaphoresis, fatigue and fever.   HENT: Negative for congestion, ear discharge, ear pain, postnasal drip, rhinorrhea, sinus pressure, sneezing, sore throat and voice change.    Eyes: Negative for discharge, " itching and visual disturbance.   Respiratory: Negative for cough, shortness of breath and wheezing.    Cardiovascular: Negative for chest pain, palpitations and leg swelling.   Gastrointestinal: Positive for abdominal pain. Negative for nausea and vomiting.   Endocrine: Negative for polydipsia, polyphagia and polyuria.   Genitourinary: Positive for dysuria and testicular pain. Negative for difficulty urinating, discharge, frequency, hematuria, penile pain, penile swelling and urgency.   Musculoskeletal: Negative for arthralgias and myalgias.   Skin: Negative for rash and wound.   Neurological: Negative for dizziness, seizures, syncope, weakness and headaches.   Hematological: Negative for adenopathy. Does not bruise/bleed easily.   Psychiatric/Behavioral: Negative for agitation and self-injury. The patient is not nervous/anxious.    All other systems reviewed and are negative.      Physical Exam     Initial Vitals [07/31/20 1001]   BP Pulse Resp Temp SpO2   136/85 88 18 97.7 °F (36.5 °C) 99 %      MAP       --         Patient gave consent to have physical exam performed.    Physical Exam    Nursing note and vitals reviewed.  Constitutional: He appears well-developed and well-nourished. He is not diaphoretic. No distress.   HENT:   Head: Normocephalic and atraumatic.   Right Ear: External ear normal.   Left Ear: External ear normal.   Nose: Nose normal.   Eyes: Pupils are equal, round, and reactive to light. Right eye exhibits no discharge. Left eye exhibits no discharge. No scleral icterus.   Neck: Normal range of motion.   Pulmonary/Chest: No respiratory distress.   Abdominal: He exhibits no distension. There is abdominal tenderness in the left upper quadrant. Hernia confirmed negative in the right inguinal area and confirmed negative in the left inguinal area.   Genitourinary:    Testes and penis normal.   Right testis shows no mass, no swelling and no tenderness. Right testis is descended. Cremasteric reflex is  not absent on the right side. Left testis shows no mass, no swelling and no tenderness. Left testis is descended. Cremasteric reflex is not absent on the left side. Uncircumcised. No phimosis, paraphimosis, hypospadias, penile erythema or penile tenderness. No discharge found.   Musculoskeletal: Normal range of motion.   Lymphadenopathy: No inguinal adenopathy noted on the right or left side.   Neurological: He is alert and oriented to person, place, and time.   Skin: Skin is dry. Capillary refill takes less than 2 seconds.         ED Course   Procedures  Labs Reviewed   POCT URINALYSIS W/O SCOPE - Abnormal; Notable for the following components:       Result Value    Bilirubin, UA 1+ (*)     Blood, UA 3+ (*)     Protein, UA 3+ (*)     Leukocytes, UA 1+ (*)     All other components within normal limits   POCT GLUCOSE - Abnormal; Notable for the following components:    POCT Glucose 186 (*)     All other components within normal limits   ISTAT PROCEDURE - Abnormal; Notable for the following components:    POC PO2 25 (*)     POC HCO3 29.3 (*)     POC SATURATED O2 46 (*)     POC Lactate 2.37 (*)     POC TCO2 31 (*)     All other components within normal limits   POCT CMP - Abnormal; Notable for the following components:    Alkaline Phosphatase,  (*)     POC BUN 23 (*)     Calcium, POC 10.6 (*)     POC Creatinine 7.7 (*)     POC Glucose 163 (*)     Protein 8.6 (*)     All other components within normal limits   TROPONIN ISTAT   POCT CBC     EKG Readings: (Independently Interpreted)   No STEMI. Rate of 87. Sinus Rhythm. PVC appreciated. Normal Axis. Abnormal EKG. LVH appreciated. QTc normal at 452. No prior EKG for comparison. Current rate is 82.     ECG Results          EKG 12-lead (In process)  Result time 07/31/20 14:01:03    In process by Interface, Lab In [facility] (07/31/20 14:01:03)                 Narrative:    Test Reason : R10.9,    Vent. Rate : 087 BPM     Atrial Rate : 087 BPM     P-R Int : 170 ms           QRS Dur : 108 ms      QT Int : 376 ms       P-R-T Axes : 070 014 127 degrees     QTc Int : 452 ms    Sinus rhythm with occasional Premature ventricular complexes  LVH with repolarization abnormality  Cannot rule out Inferior infarct ,age undetermined  Abnormal ECG  No previous ECGs available    Referred By: [email or web address]   SELF           Confirmed By:                                 Imaging Results          US Scrotum And Testicles (Final result)  Result time 07/31/20 12:09:36    Final result by Carlyle Noriega MD (07/31/20 12:09:36)                 Impression:      Small left epididymal cysts.  No testicular masses.  Normal color Doppler flow.      Electronically signed by: Carlyle Noriega MD  Date:    07/31/2020  Time:    12:09             Narrative:    EXAMINATION:  US SCROTUM AND TESTICLES    CLINICAL HISTORY:  Testicular pain, unspecified    TECHNIQUE:  Sonography of the scrotum and testes.    COMPARISON:  None.    FINDINGS:  The testicles are symmetric in echogenicity and size.  The right testicle measures 4.3 x 2.1 x 2.3 cm.  The left testicle measures 3.9 x 2.3 x 2.6 cm.  No testicular masses.  Left epididymal body cyst measuring 0.5 cm..  Left epididymal tail cyst measuring 0.9 cm.  No varicoceles.  No hydroceles..  Symmetric doppler arterial and venous flow demonstrated to both testicles.                               CT Abdomen Pelvis  Without Contrast (Final result)  Result time 07/31/20 11:35:24    Final result by Brock Figueroa MD (07/31/20 11:35:24)                 Impression:      1. No emergent/acute abdominopelvic findings appreciated.  A few stable findings above.      Electronically signed by: Brock Figueroa  Date:    07/31/2020  Time:    11:35             Narrative:    EXAMINATION:  CT ABDOMEN PELVIS WITHOUT CONTRAST    CLINICAL HISTORY:  LUQ abdominal pain;    TECHNIQUE:  Low dose axial images, sagittal and coronal reformations were obtained from the lung bases to the pubic symphysis. IV and oral  contrast were not utilized, limiting evaluation of vascular structures as well as soft tissue in hollow viscus organs.    COMPARISON:  CT abdomen/pelvis 07/06/2020    FINDINGS:  Heart: Heart is not enlarged. No significant pericardial thickening in the field of view.    Lung Bases: Imaged portions of the lung bases are clear.    Hepatobiliary: Normal in size, attenuation and morphology without appreciable surface nodularity. Punctate hepatic segment 8 hypodensity is too small to accurately characterize.  Otherwise, no sizable suspicious mass on this limited non-enhanced exam.  Gallbladder is grossly unremarkable without radiodense stones, mural thickening, pericholecystic fluid or pericholecystic fat stranding.  No biliary dilatation.    Pancreas: Pancreas is normal in size and morphology without appreciable surrounding fat stranding, sizable fluid or sizable suspicious mass on this partially limited non-enhanced exam.    Spleen: Spleen is normal in size and morphology without appreciable sizable parenchymal mass on this non-enhanced exam.    Adrenals: 1.5-cm adrenal adenoma in the medial limb of the left adrenal gland, unchanged.  Right adrenal gland is grossly unremarkable.    Kidneys: Kidneys are normal in location, size, attenuation and morphology. No obstructing nephrolith or hydroureteronephrosis bilaterally.  1.2-cm simple appearing cortical cyst in the left superior pole, not significantly changed.  At least 3 additional (1 right and 2 left) subcentimeter hypodense nodules are too small to accurately characterize but grossly unchanged.  Otherwise, no appreciable sizable suspicious mass on this limited non-enhanced exam.    Bladder: Urinary bladder is completely collapsed, limiting evaluation.  Given this limitation, there appears to be a significant fat stranding surrounding the entirety of the urinary bladder.    GI Tract: The unenhanced and unopacified stomach, small bowel and colon are normal in course  and caliber without evidence of bowel obstruction. No significant fat stranding. No free air. No free fluid. Appendix is normal.    Mesentery: Grossly unremarkable without free fluid or sizable suspicious mass on this non-enhanced exam.    Retroperitoneum: Aorta is normal in course and caliber without evidence of aneurysmal degeneration.  No sizable suspicious retroperitoneal mass, fluid collection or lymphadenopathy.    Pelvis: Reproductive organs are grossly unremarkable.  No appreciable sizable pelvic mass on this partially limited nonenhanced exam.  No appreciable free fluid or organized/drainable fluid collection.  No pelvic lymphadenopathy.    Soft tissues: Imaged/visualized soft tissues are grossly unremarkable.    Osseous structures: Minimal multilevel degenerative changes of the imaged spine.  No acute displaced fracture, dislocation or suspicious lytic/blastic osseous lesions.                                 Medical Decision Making:   History:   Old Medical Records: I decided to obtain old medical records.  Independently Interpreted Test(s):   I have ordered and independently interpreted EKG Reading(s) - see prior notes  Clinical Tests:   Lab Tests: Ordered and Reviewed  Radiological Study: Reviewed and Ordered  Medical Tests: Ordered and Reviewed  Chief complaint: LUQ pain and testicular pain  Differential diagnosis:    Treatment in the ED: PE, morphine  Patient reports feeling better after treatment in the ER.      Discussed treatment, prescriptions, labs, and imaging results.    Discharge home with Cipro, Bentyl   Fill and take prescriptions as directed.  Return to the ED if symptoms worsen or do not resolve.   Answered questions and discussed discharge plan.    Patient feels better and is ready for discharge.  Follow up with PCP/specialist in 1 day.       APC / Resident Notes:   Patient is a 64-year-old male who presents with left upper quadrant abdominal pain and bilateral testicle pain.  On physical  exam patient is afebrile and nontoxic in no apparent distress.  His abdomen is soft nontender x3 quadrants but there is tenderness in the left upper quadrant.  There is also no tenderness of the bilateral testicles although he reports that they are painful.  There is no color change and no deficiency of the cremasteric reflex.  Differential diagnosis includes pancreatitis, gastritis, hepatitis, testicular torsion, hydrocele, varicocele.  I have ordered appropriate labs and imaging, as well as meds to palliate the patient's pain.       Scribe Attestation:   Scribe #1: I performed the above scribed service and the documentation accurately describes the services I performed. I attest to the accuracy of the note.    --ANGY Walsh DNP ACNP-BC FNP-C         ED Course as of Jul 31 2031 Fri Jul 31, 2020   1116 EKG 87, sr with pvc's, no stemi.  Signed by GINGER Carmona.    [VC]   1118 POCT Glucose(!): 186 [VC]   1118  exam chaperoned by JUAN ANTONIO Esquivel.  Pt was apologetic and tearful.  Abd tender in left upper quad.   exam normal.    [VC]   1213 Increased alk phos, esrd on hd, mild hyperglycemia, increased protein.  This is in line with the patient's previous results.   POCT CMP(!) [VC]   1214 POC Cardiac Troponin I: 0.04 [VC]   1214 Possible uti secondary as blood protein and leuks are present.  Will treat and culture.   POCT URINALYSIS W/O SCOPE(!) [VC]   1214 Lactic is just over normal, I do not suspect sepsis.   POC Lactate(!): 2.37 [VC]   1216 CBC: leukocyte count was increased, the H&H was normal. The platelet count was normal.        [VC]   1217 Small left epididymal cysts.  No testicular masses.  Normal color Doppler flow.      US Scrotum And Testicles [VC]   1217 1. No emergent/acute abdominopelvic findings appreciated.  A few stable findings above.      CT Abdomen Pelvis  Without Contrast [VC]      ED Course User Index  [VC] Mejia Walsh DNP     The patient is discharged home in good condition to follow-up with  referrals provided and should return for any worsening or changes in condition.See above for analyses of radiology, labs, and events during pt's visit and direct actions taken. Symptomatic therapies and return precautions on AVS.   Medication choices were made after reviewing allergies, medications, history, available laboratories. See below for discharge prescriptions if any and disposition.             Clinical Impression:     1. Acute cystitis without hematuria    2. Testicular pain    3. Left upper quadrant abdominal pain of unknown etiology            Disposition:   Disposition: Discharged  Condition: Stable     ED Disposition Condition    Discharge Stable        ED Prescriptions     Medication Sig Dispense Start Date End Date Auth. Provider    ciprofloxacin HCl (CIPRO) 500 MG tablet Take 1 tablet (500 mg total) by mouth once daily. for 10 days 10 tablet 7/31/2020 8/10/2020 Mejia Walsh DNP    dicyclomine (BENTYL) 20 mg tablet Take 1 tablet (20 mg total) by mouth 4 (four) times daily before meals and nightly. for 7 days 28 tablet 7/31/2020 8/7/2020 Mejia Walsh DNP        Follow-up Information     Follow up With Specialties Details Why Contact Info    Yulisa Wells MD Urology Schedule an appointment as soon as possible for a visit  for scrotal pain [address]  [address]  [facility] [address]  [phone]      [facility] Locations Gastroenterology Schedule an appointment as soon as possible for a visit  for left upper quadrant abd pain [address]  [address]  [place] [id number] [phone]

## 2020-07-31 NOTE — DISCHARGE INSTRUCTIONS
You have been given a medication that may cause drowsiness, do not drive or operate heavy machinery with this medication. Take antibiotics as ordered. Return to the Emergency department for any worsening or failure to improve, otherwise follow up with your primary care provider.

## 2020-08-03 ENCOUNTER — HOSPITAL ENCOUNTER (EMERGENCY)
Facility: HOSPITAL | Age: 65
Discharge: HOME | End: 2020-08-03
Attending: EMERGENCY MEDICINE
Payer: MEDICARE

## 2020-08-03 VITALS
SYSTOLIC BLOOD PRESSURE: 111 MMHG | OXYGEN SATURATION: 99 % | WEIGHT: 200 LBS | DIASTOLIC BLOOD PRESSURE: 66 MMHG | HEART RATE: 72 BPM | BODY MASS INDEX: 27.09 KG/M2 | TEMPERATURE: 98 F | HEIGHT: 72 IN | RESPIRATION RATE: 18 BRPM

## 2020-08-03 DIAGNOSIS — R31.9 HEMATURIA, UNSPECIFIED TYPE: ICD-10-CM

## 2020-08-03 DIAGNOSIS — R82.998 LEUKOCYTES IN URINE: ICD-10-CM

## 2020-08-03 DIAGNOSIS — R10.9 LEFT FLANK PAIN: Primary | ICD-10-CM

## 2020-08-03 DIAGNOSIS — N18.6 ESRD (END STAGE RENAL DISEASE) ON DIALYSIS: ICD-10-CM

## 2020-08-03 DIAGNOSIS — Z99.2 ESRD (END STAGE RENAL DISEASE) ON DIALYSIS: ICD-10-CM

## 2020-08-03 LAB
BILIRUBIN, POC UA: NEGATIVE
BLOOD, POC UA: ABNORMAL
CLARITY, POC UA: CLEAR
COLOR, POC UA: YELLOW
GLUCOSE, POC UA: NEGATIVE
KETONES, POC UA: NEGATIVE
LEUKOCYTE EST, POC UA: ABNORMAL
NITRITE, POC UA: NEGATIVE
PH UR STRIP: 8.5 [PH]
PROTEIN, POC UA: ABNORMAL
SPECIFIC GRAVITY, POC UA: 1.02
UROBILINOGEN, POC UA: 0.2 E.U./DL

## 2020-08-03 PROCEDURE — 85025 COMPLETE CBC W/AUTO DIFF WBC: CPT | Mod: ER

## 2020-08-03 PROCEDURE — 87086 URINE CULTURE/COLONY COUNT: CPT

## 2020-08-03 PROCEDURE — 80053 COMPREHEN METABOLIC PANEL: CPT | Mod: ER

## 2020-08-03 PROCEDURE — 25000003 PHARM REV CODE 250: Mod: ER | Performed by: PHYSICIAN ASSISTANT

## 2020-08-03 PROCEDURE — 81003 URINALYSIS AUTO W/O SCOPE: CPT | Mod: ER

## 2020-08-03 PROCEDURE — 99284 EMERGENCY DEPT VISIT MOD MDM: CPT | Mod: 25,ER

## 2020-08-03 RX ORDER — ACETAMINOPHEN 500 MG
1000 TABLET ORAL
Status: COMPLETED | OUTPATIENT
Start: 2020-08-03 | End: 2020-08-03

## 2020-08-03 RX ORDER — HYDROCODONE BITARTRATE AND ACETAMINOPHEN 5; 325 MG/1; MG/1
1 TABLET ORAL
Status: COMPLETED | OUTPATIENT
Start: 2020-08-03 | End: 2020-08-03

## 2020-08-03 RX ORDER — CYCLOBENZAPRINE HCL 10 MG
10 TABLET ORAL 3 TIMES DAILY PRN
Qty: 15 TABLET | Refills: 0 | Status: SHIPPED | OUTPATIENT
Start: 2020-08-03 | End: 2020-08-08

## 2020-08-03 RX ORDER — LIDOCAINE 50 MG/G
1 PATCH TOPICAL DAILY
Qty: 6 PATCH | Refills: 0 | Status: SHIPPED | OUTPATIENT
Start: 2020-08-03

## 2020-08-03 RX ORDER — ORPHENADRINE CITRATE 100 MG/1
100 TABLET, EXTENDED RELEASE ORAL ONCE
Status: COMPLETED | OUTPATIENT
Start: 2020-08-03 | End: 2020-08-03

## 2020-08-03 RX ORDER — HYDROCODONE BITARTRATE AND ACETAMINOPHEN 5; 325 MG/1; MG/1
1 TABLET ORAL EVERY 6 HOURS PRN
Qty: 12 TABLET | Refills: 0 | Status: SHIPPED | OUTPATIENT
Start: 2020-08-03 | End: 2020-08-06

## 2020-08-03 RX ADMIN — ACETAMINOPHEN 1000 MG: 500 TABLET ORAL at 01:08

## 2020-08-03 RX ADMIN — ORPHENADRINE CITRATE 100 MG: 100 TABLET, EXTENDED RELEASE ORAL at 01:08

## 2020-08-03 RX ADMIN — HYDROCODONE BITARTRATE AND ACETAMINOPHEN 1 TABLET: 5; 325 TABLET ORAL at 03:08

## 2020-08-03 NOTE — ED PROVIDER NOTES
Encounter Date: 8/3/2020    SCRIBE #1 NOTE: I, Tana Sandoval, am scribing for, and in the presence of,  Alistair VELASQUEZ. I have scribed the following portions of the note - Other sections scribed: HPI, ROS, PE.       History     Chief Complaint   Patient presents with    penile complaint     pt presents to ED with c/o bleeding from penis with pain to penile shaft and testicles. Started Friday and has not gotten any better, was seen at this facility on friday for same complaint     This is a 64 y.o male with ESRD, DM, and HTN who presents to the ED complaining of dysuria and hematuria x 1 month. He was seen at this ED for this complaint on 7/31 and was treated with Cipro and he reports finishing the prescription. He denies N/V/D, fever, penile pain, increased urinary frequency, abdominal pain, or testicular pain. He notes chronic mild cough and rhinorrhea.   Patient is also complaining of left flank pain x 1 month. His pain is exacerbated with movement. He denies SOB or chest pain.   Patient is a poor historian. Patient is currently on dialysis.     The history is provided by the patient. No  was used.     Review of patient's allergies indicates:   Allergen Reactions    Lisinopril Other (See Comments)     DIARRHEA    Penicillins Rash     Past Medical History:   Diagnosis Date    Asthma     Diabetes mellitus     Hypertension     Renal disorder      Past Surgical History:   Procedure Laterality Date    CORONARY STENT PLACEMENT      gallstone removal      hemodialysis shunt right forearm       No family history on file.  Social History     Tobacco Use    Smoking status: Current Some Day Smoker     Types: Cigarettes   Substance Use Topics    Alcohol use: Yes     Comment: occasional    Drug use: No     Review of Systems   Constitutional: Negative for fever.   HENT: Positive for rhinorrhea.    Respiratory: Positive for cough. Negative for shortness of breath.    Cardiovascular: Negative  for chest pain.   Gastrointestinal: Negative for abdominal pain, diarrhea, nausea and vomiting.   Genitourinary: Positive for dysuria, flank pain and hematuria. Negative for frequency, penile pain and testicular pain.   All other systems reviewed and are negative.      Physical Exam     Initial Vitals [08/03/20 1057]   BP Pulse Resp Temp SpO2   (!) 149/106 60 20 98.2 °F (36.8 °C) 99 %      MAP       --         Physical Exam    Nursing note and vitals reviewed.  Constitutional: He appears well-developed and well-nourished. He is not diaphoretic. No distress.   HENT:   Head: Normocephalic and atraumatic.   Eyes: EOM are normal.   Neck: Normal range of motion. Neck supple.   Pulmonary/Chest: No respiratory distress.   Abdominal: Soft. Normal appearance. He exhibits no distension. There is no abdominal tenderness. There is no rigidity, no rebound, no guarding and no CVA tenderness.   Musculoskeletal: Normal range of motion.      Comments: TTP to the left mid flank intercostal musculature.  No gross deformity, bruising, erythema, or vesicular lesions. No CVA tenderness.  Tenderness does not extend into the abdomen.   Neurological: He is alert and oriented to person, place, and time.   Skin: Skin is warm and dry. Capillary refill takes less than 2 seconds.   Psychiatric: He has a normal mood and affect. His behavior is normal.         ED Course   Procedures  Labs Reviewed   POCT URINALYSIS W/O SCOPE - Abnormal; Notable for the following components:       Result Value    Blood, UA 2+ (*)     Protein, UA 3+ (*)     Leukocytes, UA 1+ (*)     All other components within normal limits   C. TRACHOMATIS/N. GONORRHOEAE BY AMP DNA   CULTURE, URINE   POCT URINALYSIS W/O SCOPE   POCT CBC   POCT CMP          Imaging Results          X-Ray Chest PA And Lateral (Final result)  Result time 08/03/20 12:57:15    Final result by Brock Figueroa MD (08/03/20 12:57:15)                 Impression:      1. No acute cardiopulmonary process  appreciated.      Electronically signed by: Brock Figueroa  Date:    08/03/2020  Time:    12:57             Narrative:    EXAMINATION:  XR CHEST PA AND LATERAL    CLINICAL HISTORY:  Unspecified abdominal pain    TECHNIQUE:  PA and lateral views of the chest were performed.    COMPARISON:  Chest radiograph 01/31/2019    FINDINGS:  Postsurgical changes compatible with prior CABG.  Otherwise, cardiomediastinal silhouette is within normal limits.    No focal consolidation, overt interstitial edema, sizable pleural effusion or pneumothorax.    Multilevel degenerative changes of the imaged spine.                                 Medical Decision Making:   History:   Old Medical Records: I decided to obtain old medical records.  Clinical Tests:   Lab Tests: Ordered and Reviewed  Radiological Study: Ordered and Reviewed  ED Management:  I am unsure of the etiology of this patient's symptoms, however, I do not believe they are of emergent/life threatening etiology at this time. Symptoms present for at least a month per patient; may be longer but patient isn't entirely sure and is a poor historian.  Flank pain does seem muscular in nature.  There is no overlying cellulitis or shingles.  Recent CT of abdomen/pelvis for same symptoms shows no ureteral stone.  Not consistent with pyelonephritis.  There is no urosepsis.  Patient is once again noted to have leukocytes in his urine.  Unfortunately, urine culture has not previously been obtained for this patient and it is not clear if this is a contaminant versus true infection.  Will send urine culture today and patient verifies that he has a valid phone number on file for callback if indicated. Not enough urine for GC and patient does not want to attempt to provide more urine.  Unable to obtain renal function today and the patient refuses further blood draws in the ED for investigation.  He is compliant with hemodialysis.  No lower lobe pneumonia today.  Recent ultrasound of  testicle shows no acute findings.  He is not anemic.  His pain is controlled.  Advising follow-up with urology; referral placed. Would benefit from cystoscopy as an outpatient. Neoplastic process is a consideration. Also advising follow-up with his nephrologist.  Strict return precautions discussed with patient who is agreeable to the plan.            Scribe Attestation:   Scribe #1: I performed the above scribed service and the documentation accurately describes the services I performed. I attest to the accuracy of the note.    Scribe attestation: I, Alistair Evans, personally performed the services described in this documentation. All medical record entries made by the scribe were at my direction and in my presence.  I have reviewed the chart and agree that the record reflects my personal performance and is accurate and complete                       Clinical Impression:     1. Left flank pain    2. Leukocytes in urine    3. Hematuria, unspecified type    4. ESRD (end stage renal disease) on dialysis                ED Disposition Condition    Discharge Stable        ED Prescriptions     Medication Sig Dispense Start Date End Date Auth. Provider    HYDROcodone-acetaminophen (NORCO) 5-325 mg per tablet Take 1 tablet by mouth every 6 (six) hours as needed for Pain. 12 tablet 8/3/2020 8/6/2020 Alistair Evans PA-C    cyclobenzaprine (FLEXERIL) 10 MG tablet Take 1 tablet (10 mg total) by mouth 3 (three) times daily as needed for Muscle spasms. 15 tablet 8/3/2020 8/8/2020 Alistair Evans PA-C    lidocaine (LIDODERM) 5 % Place 1 patch onto the skin once daily. Remove & Discard patch within 12 hours or as directed by MD. May use 4% formulation if more affordable for patient. 6 patch 8/3/2020  Alistair Evans PA-C        Follow-up Information     Follow up With Specialties Details Why Contact Info Additional Information    [facility] [place]  Schedule an appointment as soon as possible for a visit in 1 day  For reevaluation, AND to establish primary care [address] [place] [address]  [phone]       [facility] Urology Schedule an appointment as soon as possible for a visit in 1 day For further evaluation, For more definitive management of your symptoms [address]  [facility] [address] [phone] [address] [place] [address] Surgical Oncology, Orthopedic Surgery, Genetics, Physical Medicine and Rehabilitation, Occupational Therapy, Radiology Go in 1 day as an alternative to specialist evaluation [address] [place] [address]  [phone]       [facility] Emergency Medicine Go to  If symptoms worsen [address] [facility] [address] [phone]

## 2020-08-03 NOTE — PROVIDER PROGRESS NOTES - EMERGENCY DEPT.
Emergency Department TeleTRIAGE Encounter Note      CHIEF COMPLAINT    Chief Complaint   Patient presents with    penile complaint     pt presents to ED with c/o bleeding from penis with pain to penile shaft and testicles. Started Friday and has not gotten any better, was seen at this facility on friday for same complaint       VITAL SIGNS   Initial Vitals [08/03/20 1057]   BP Pulse Resp Temp SpO2   (!) 149/106 60 20 98.2 °F (36.8 °C) 99 %      MAP       --            ALLERGIES    Review of patient's allergies indicates:   Allergen Reactions    Lisinopril Other (See Comments)     DIARRHEA    Penicillins Rash       PROVIDER TRIAGE NOTE  This is a teletriage evaluation of a 64 y.o. male presenting to the ED with c/o penile pain and testicular pain with hematuria. Seen in this ED 7/31 with US, CT, and Labs. Dx: Cystitis with hematuria. Initial orders will be placed and care will be transferred to an alternate provider when patient is roomed for a full evaluation. Any additional orders and the final disposition will be determined by that provider.         ORDERS  Labs Reviewed   POCT URINALYSIS W/O SCOPE       ED Orders (720h ago, onward)    Start Ordered     Status Ordering Provider    08/03/20 1111 08/03/20 1110  POCT CBC  Once      Ordered BERNIE LAUREN    08/03/20 1111 08/03/20 1110  POCT CMP  Once      Ordered BERNIE LUAREN    08/03/20 1101 08/03/20 1100  POCT URINALYSIS W/O SCOPE  Once      Ordered WILLIAM KESSLER            Virtual Visit Note: The provider triage portion of this emergency department evaluation and documentation was performed via [employer], a HIPAA-compliant telemedicine application, in concert with a tele-presenter in the room. A face to face patient evaluation with one of my colleagues will occur once the patient is placed in an emergency department room.      DISCLAIMER: This note was prepared with [employer] voice recognition transcription software. Garbled syntax, mangled pronouns, and  other bizarre constructions may be attributed to that software system.

## 2020-08-05 LAB — BACTERIA UR CULT: NORMAL

## 2020-09-30 ENCOUNTER — HOSPITAL ENCOUNTER (EMERGENCY)
Facility: HOSPITAL | Age: 65
Discharge: HOME | End: 2020-09-30
Attending: EMERGENCY MEDICINE
Payer: MEDICARE

## 2020-09-30 VITALS
TEMPERATURE: 98 F | BODY MASS INDEX: 27.09 KG/M2 | SYSTOLIC BLOOD PRESSURE: 160 MMHG | DIASTOLIC BLOOD PRESSURE: 113 MMHG | HEART RATE: 62 BPM | OXYGEN SATURATION: 100 % | WEIGHT: 200 LBS | HEIGHT: 72 IN | RESPIRATION RATE: 20 BRPM

## 2020-09-30 DIAGNOSIS — M79.2 NEUROPATHIC PAIN OF FOOT, UNSPECIFIED LATERALITY: Primary | ICD-10-CM

## 2020-09-30 DIAGNOSIS — M79.672 FOOT PAIN, LEFT: ICD-10-CM

## 2020-09-30 DIAGNOSIS — M79.671 FOOT PAIN, RIGHT: ICD-10-CM

## 2020-09-30 DIAGNOSIS — L97.511 SKIN ULCER OF TOE OF RIGHT FOOT, LIMITED TO BREAKDOWN OF SKIN: ICD-10-CM

## 2020-09-30 LAB — POCT GLUCOSE: 196 MG/DL (ref 70–110)

## 2020-09-30 PROCEDURE — 25000003 PHARM REV CODE 250: Mod: ER | Performed by: PHYSICIAN ASSISTANT

## 2020-09-30 PROCEDURE — 82962 GLUCOSE BLOOD TEST: CPT | Mod: ER

## 2020-09-30 PROCEDURE — 99284 EMERGENCY DEPT VISIT MOD MDM: CPT | Mod: 25,ER

## 2020-09-30 RX ORDER — GABAPENTIN 100 MG/1
100 CAPSULE ORAL
Qty: 12 CAPSULE | Refills: 0 | Status: SHIPPED | OUTPATIENT
Start: 2020-09-30 | End: 2021-09-30

## 2020-09-30 RX ORDER — HYDROCODONE BITARTRATE AND ACETAMINOPHEN 5; 325 MG/1; MG/1
1 TABLET ORAL EVERY 6 HOURS PRN
Qty: 8 TABLET | Refills: 0 | Status: SHIPPED | OUTPATIENT
Start: 2020-09-30

## 2020-09-30 RX ORDER — HYDROCODONE BITARTRATE AND ACETAMINOPHEN 5; 325 MG/1; MG/1
1 TABLET ORAL
Status: COMPLETED | OUTPATIENT
Start: 2020-09-30 | End: 2020-09-30

## 2020-09-30 RX ADMIN — HYDROCODONE BITARTRATE AND ACETAMINOPHEN 1 TABLET: 5; 325 TABLET ORAL at 12:09
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
## 2020-09-30 NOTE — FIRST PROVIDER EVALUATION
Emergency Department TeleTriage Encounter Note      CHIEF COMPLAINT    Chief Complaint   Patient presents with    Foot Pain     pt presents to ED with c/o bilateral foot pain that started for a couple of days, sharp in nature       VITAL SIGNS   Initial Vitals [09/30/20 1109]   BP Pulse Resp Temp SpO2   -- 62 16 98.1 °F (36.7 °C) 100 %      MAP       --            ALLERGIES    Review of patient's allergies indicates:   Allergen Reactions    Lisinopril Other (See Comments)     DIARRHEA    Penicillins Rash       PROVIDER TRIAGE NOTE  This is a teletriage evaluation of a 64 y.o. male presenting to the ED with c/o bilateral foot pain. No injuries or trauma. No wounds. Initial orders will be placed and care will be transferred to an alternate provider when patient is roomed for a full evaluation. Any additional orders and the final disposition will be determined by that provider.         ORDERS  Labs Reviewed   POCT GLUCOSE - Abnormal; Notable for the following components:       Result Value    POCT Glucose 196 (*)     All other components within normal limits       ED Orders (720h ago, onward)    Start Ordered     Status Ordering Provider    09/30/20 1125 09/30/20 1124  X-Ray Foot Complete Left  1 time imaging      Ordered BERNIE LAUREN    09/30/20 1125 09/30/20 1124  X-Ray Foot Complete Right  1 time imaging      Ordered BERNIE LAUREN    09/30/20 1121 09/30/20 1121  POCT glucose  Once  Completed    Final result EMERGENCY, DEPT PHYSICIAN            Virtual Visit Note: The provider triage portion of this emergency department evaluation and documentation was performed via [employer], a HIPAA-compliant telemedicine application, in concert with a tele-presenter in the room. A face to face patient evaluation with one of my colleagues will occur once the patient is placed in an emergency department room.      DISCLAIMER: This note was prepared with [employer]????[employer] voice recognition transcription software. Garbled syntax,  mangled pronouns, and other bizarre constructions may be attributed to that software system.

## 2020-09-30 NOTE — ED TRIAGE NOTES
While in triage, unable to obtain blood pressure with machine. Pt is AAOx4 and NAD noted. Attempted to obtain manual blood pressure on left upper extremity without success. Cuff applied to left lower extremity and monitor could not read. Reported to EVA Delarosa.

## 2020-10-11 ENCOUNTER — HOSPITAL ENCOUNTER (EMERGENCY)
Facility: HOSPITAL | Age: 65
Discharge: HOME | End: 2020-10-11
Attending: EMERGENCY MEDICINE
Payer: MEDICARE

## 2020-10-11 VITALS
HEIGHT: 72 IN | DIASTOLIC BLOOD PRESSURE: 55 MMHG | SYSTOLIC BLOOD PRESSURE: 106 MMHG | BODY MASS INDEX: 33.86 KG/M2 | RESPIRATION RATE: 20 BRPM | TEMPERATURE: 98 F | WEIGHT: 250 LBS | OXYGEN SATURATION: 99 % | HEART RATE: 58 BPM

## 2020-10-11 DIAGNOSIS — S80.852A FOREIGN BODY OF LEFT LOWER LEG, INITIAL ENCOUNTER: Primary | ICD-10-CM

## 2020-10-11 DIAGNOSIS — M79.606 LEG PAIN: ICD-10-CM

## 2020-10-11 DIAGNOSIS — M79.605 PAIN IN LEFT LEG: ICD-10-CM

## 2020-10-11 LAB — POCT GLUCOSE: 167 MG/DL (ref 70–110)

## 2020-10-11 PROCEDURE — 96372 THER/PROPH/DIAG INJ SC/IM: CPT | Mod: ER

## 2020-10-11 PROCEDURE — 63600175 PHARM REV CODE 636 W HCPCS: Mod: ER | Performed by: EMERGENCY MEDICINE

## 2020-10-11 PROCEDURE — 93005 ELECTROCARDIOGRAM TRACING: CPT | Mod: ER

## 2020-10-11 PROCEDURE — 82962 GLUCOSE BLOOD TEST: CPT | Mod: ER

## 2020-10-11 PROCEDURE — 93010 EKG 12-LEAD: ICD-10-PCS | Mod: ,,, | Performed by: INTERNAL MEDICINE

## 2020-10-11 PROCEDURE — 93010 ELECTROCARDIOGRAM REPORT: CPT | Mod: ,,, | Performed by: INTERNAL MEDICINE

## 2020-10-11 PROCEDURE — 99285 EMERGENCY DEPT VISIT HI MDM: CPT | Mod: 25,ER

## 2020-10-11 PROCEDURE — 25000003 PHARM REV CODE 250: Mod: ER | Performed by: EMERGENCY MEDICINE

## 2020-10-11 RX ORDER — ACETAMINOPHEN 500 MG
1000 TABLET ORAL EVERY 6 HOURS PRN
Qty: 30 TABLET | Refills: 0 | Status: SHIPPED | OUTPATIENT
Start: 2020-10-11

## 2020-10-11 RX ORDER — KETOROLAC TROMETHAMINE 30 MG/ML
30 INJECTION, SOLUTION INTRAMUSCULAR; INTRAVENOUS
Status: COMPLETED | OUTPATIENT
Start: 2020-10-11 | End: 2020-10-11

## 2020-10-11 RX ORDER — CYCLOBENZAPRINE HCL 10 MG
10 TABLET ORAL
Status: COMPLETED | OUTPATIENT
Start: 2020-10-11 | End: 2020-10-11

## 2020-10-11 RX ORDER — CYCLOBENZAPRINE HCL 10 MG
10 TABLET ORAL 3 TIMES DAILY PRN
Qty: 15 TABLET | Refills: 0 | Status: SHIPPED | OUTPATIENT
Start: 2020-10-11 | End: 2020-10-16

## 2020-10-11 RX ORDER — IBUPROFEN 600 MG/1
600 TABLET ORAL EVERY 6 HOURS PRN
Qty: 20 TABLET | Refills: 0 | Status: SHIPPED | OUTPATIENT
Start: 2020-10-11

## 2020-10-11 RX ORDER — DICLOFENAC SODIUM 10 MG/G
2 GEL TOPICAL 4 TIMES DAILY PRN
Qty: 1 TUBE | Refills: 0 | Status: SHIPPED | OUTPATIENT
Start: 2020-10-11 | End: 2020-10-21

## 2020-10-11 RX ADMIN — CYCLOBENZAPRINE 10 MG: 10 TABLET, FILM COATED ORAL at 01:10

## 2020-10-11 RX ADMIN — KETOROLAC TROMETHAMINE 30 MG: 30 INJECTION, SOLUTION INTRAMUSCULAR; INTRAVENOUS at 01:10

## 2020-10-11 NOTE — ED PROVIDER NOTES
Encounter Date: 10/11/2020    SCRIBE #1 NOTE: I, Elsie Ott, am scribing for, and in the presence of,  Dr. Carmona. I have scribed the following portions of the note - the EKG reading. Other sections scribed: HPI, ROS, PE.       History     Chief Complaint   Patient presents with    Leg Pain     L lower leg pain x 1 week, denies fall or injury     Jameson Link is a 64 y.o. male who presents to the ED complaining of left leg pain onset one week ago. Denies trauma, fall, or injury. History of blood clot is unknown. He took Tylenol for pain yesterday with some alleviation. Denies fever, chills, nausea, vomiting, diarrhea, SOB, chest pain or dysuria. Patient is allergic to penicillins and Lisinopril.    The history is provided by the patient. No  was used.     Review of patient's allergies indicates:   Allergen Reactions    Lisinopril Other (See Comments)     DIARRHEA    Penicillins Rash     Past Medical History:   Diagnosis Date    Asthma     Diabetes mellitus     Hypertension     Renal disorder      Past Surgical History:   Procedure Laterality Date    CORONARY STENT PLACEMENT      gallstone removal      hemodialysis shunt right forearm       No family history on file.  Social History     Tobacco Use    Smoking status: Current Some Day Smoker     Types: Cigarettes   Substance Use Topics    Alcohol use: Yes     Comment: occasional    Drug use: No     Review of Systems   Constitutional: Negative for chills and fever.   HENT: Negative for rhinorrhea and sore throat.    Respiratory: Negative for shortness of breath.    Cardiovascular: Negative for chest pain and leg swelling.   Gastrointestinal: Negative for diarrhea, nausea and vomiting.   Genitourinary: Negative for dysuria.   Musculoskeletal: Positive for myalgias (left leg pain).   Skin: Negative for rash.   Neurological: Negative for numbness.   All other systems reviewed and are negative.      Physical Exam     Initial Vitals   BP  Pulse Resp Temp SpO2   10/11/20 1241 10/11/20 1237 10/11/20 1237 10/11/20 1237 10/11/20 1237   (!) 106/55 (!) 58 20 97.5 °F (36.4 °C) 99 %      MAP       --                Patient gave consent to have physical exam performed.    Physical Exam    Nursing note and vitals reviewed.  Constitutional: He appears well-developed and well-nourished.   HENT:   Head: Normocephalic and atraumatic.   Right Ear: External ear normal.   Left Ear: External ear normal.   Nose: Nose normal.   Mouth/Throat: Oropharynx is clear and moist.   Eyes: Conjunctivae and EOM are normal. Pupils are equal, round, and reactive to light.   Neck: Normal range of motion. Neck supple.   Cardiovascular: Normal rate, regular rhythm, normal heart sounds, intact distal pulses and normal pulses. Exam reveals no gallop and no friction rub.    No murmur heard.  Pulses:       Dorsalis pedis pulses are 2+ on the right side and 2+ on the left side.        Posterior tibial pulses are 2+ on the right side and 2+ on the left side.   Pulmonary/Chest: Breath sounds normal. No stridor. No respiratory distress. He has no wheezes. He has no rhonchi. He has no rales. He exhibits no tenderness.   Abdominal: Soft. Bowel sounds are normal. He exhibits no distension and no mass. There is no abdominal tenderness. There is no rigidity, no rebound and no guarding.   Musculoskeletal: Normal range of motion.      Left lower leg: He exhibits tenderness. He exhibits no bony tenderness and no swelling. No edema.      Comments: Tenderness to left lower calf. Positive Sinid's sign. No erythema and no puncture wounds appreciated. No break in the skin appreciated.   Neurological: He is alert and oriented to person, place, and time. No cranial nerve deficit or sensory deficit. GCS score is 15. GCS eye subscore is 4. GCS verbal subscore is 5. GCS motor subscore is 6.   Skin: Skin is warm and dry. Capillary refill takes less than 2 seconds. No rash noted.   Psychiatric: He has a normal  mood and affect. His behavior is normal.         ED Course   Procedures  Labs Reviewed   POCT GLUCOSE - Abnormal; Notable for the following components:       Result Value    POCT Glucose 167 (*)     All other components within normal limits     EKG Readings: (Independently Interpreted)   No STEMI. Rate of 69. Normal Sinus Rhythm. Normal Axis. Normal EKG. QTc normal at 445. When compared to prior EKG dated 07/31/20 rate decreased by 18 bpm.         ECG Results          EKG 12-lead (In process)  Result time 10/12/20 08:51:43    In process by Interface, Lab In [facility] (10/12/20 08:51:43)                 Narrative:    Test Reason : M79.606,    Vent. Rate : 069 BPM     Atrial Rate : 069 BPM     P-R Int : 196 ms          QRS Dur : 110 ms      QT Int : 416 ms       P-R-T Axes : 065 003 073 degrees     QTc Int : 445 ms    Normal sinus rhythm  Normal ECG  When compared with ECG of 31-JUL-2020 11:05,  Premature ventricular complexes are no longer Present  Minimal criteria for Inferior infarct are no longer Present  Non-specific change in ST segment in Lateral leads  Nonspecific T wave abnormality has replaced inverted T waves in Lateral  leads    Referred By: [email or web address]   SELF           Confirmed By:                             Imaging Results          US Lower Extremity Veins Left (Final result)  Result time 10/11/20 13:57:04    Final result by Rell Gallegos MD (10/11/20 13:57:04)                 Impression:      No evidence of deep venous thrombosis in the left lower extremity.      Electronically signed by: Rell Gallegos MD  Date:    10/11/2020  Time:    13:57             Narrative:    EXAMINATION:  US LOWER EXTREMITY VEINS LEFT    CLINICAL HISTORY:  Other specified soft tissue disorders    TECHNIQUE:  Duplex and color flow Doppler evaluation and graded compression of the left lower extremity veins was performed.    COMPARISON:  None    FINDINGS:  Left thigh veins: The common femoral, femoral, popliteal, upper greater  saphenous, and deep femoral veins are patent and free of thrombus. The veins are normally compressible and have normal phasic flow and augmentation response.    Left calf veins: The visualized calf veins are patent.    Contralateral CFV: The contralateral (right) common femoral vein is patent and free of thrombus.    Miscellaneous: None                               X-Ray Tibia Fibula 2 View Left (Final result)  Result time 10/11/20 13:30:45    Final result by Arielle Dawn MD (10/11/20 13:30:45)                 Impression:      As above.      Electronically signed by: Arielle Dawn MD  Date:    10/11/2020  Time:    13:30             Narrative:    EXAMINATION:  XR TIBIA FIBULA 2 VIEW LEFT    CLINICAL HISTORY:  Pain in left leg    TECHNIQUE:  AP and lateral views of the left tibia and fibula were performed.    COMPARISON:  None.    FINDINGS:  There is a 9 mm linear radiopaque foreign body seen within the soft tissues of the calf posterolaterally.  Surgical clips seen in the medial aspect of the proximal tibia.  Extensive vascular calcifications are noted.  No fracture or dislocations are seen.                                 Medical Decision Making:   History:   Old Medical Records: I decided to obtain old medical records.  Independently Interpreted Test(s):   I have ordered and independently interpreted EKG Reading(s) - see prior notes  Clinical Tests:   Lab Tests: Reviewed and Ordered  Radiological Study: Reviewed and Ordered  Medical Tests: Reviewed and Ordered  Chief complaint: left leg pain  Differential diagnosis: Hyperglycemia, muscle spasm, sprain, strain, contusion, fracture, and DVT.     Treatment in the ED: PE, ketorolac, and cyclobenzaprine.  Patient reports feeling better after treatment in the ER.      Discussed treatment, prescriptions, labs, and imaging results.    2:07 PM  Area of tenderness found on exam is not located near the radiopaque foreign body found on x-ray.    Discharge home with  ibuprofen, tylenol, Voltaren, and flexeril.  Fill and take prescriptions as directed.  Return to the ED if symptoms worsen or do not resolve.   Answered questions and discussed discharge plan.    Patient feels better and is ready for discharge.  Follow up with PCP/specialist in 1 day.            Scribe Attestation:   Scribe #1: I performed the above scribed service and the documentation accurately describes the services I performed. I attest to the accuracy of the note.     I, Dr. Elin Carmona, personally performed the services described in this documentation. This document was produced by a scribe under my direction and in my presence. All medical record entries made by the scribe were at my direction and in my presence.  I have reviewed the chart and agree that the record reflects my personal performance and is accurate and complete. Elin Carmona DO.     10/13/2020 9:46 AM                    Clinical Impression:     ICD-10-CM ICD-9-CM   1. Foreign body of left lower leg, initial encounter  S80.852A 916.6   2. Pain in left leg  M79.605 729.5   3. Leg pain  M79.606 729.5                          ED Disposition Condition    Discharge Stable        ED Prescriptions     Medication Sig Dispense Start Date End Date Auth. Provider    ibuprofen (ADVIL,MOTRIN) 600 MG tablet Take 1 tablet (600 mg total) by mouth every 6 (six) hours as needed for Pain (Take with food as needed for mild-to-moderate pain). 20 tablet 10/11/2020  Elin Carmona DO    acetaminophen (TYLENOL) 500 MG tablet Take 2 tablets (1,000 mg total) by mouth every 6 (six) hours as needed for Pain. 30 tablet 10/11/2020  Elin Carmona DO    diclofenac sodium (VOLTAREN) 1 % Gel Apply 2 g topically 4 (four) times daily as needed (Apply to painful area up to 4 times a day as needed for pain). Apply to painful area 4 times a day as needed for pain 1 Tube 10/11/2020 10/21/2020 Elin Carmona DO    cyclobenzaprine (FLEXERIL) 10 MG tablet Take 1 tablet (10 mg  total) by mouth 3 (three) times daily as needed. 15 tablet 10/11/2020 10/16/2020 Elin Carmona DO        Follow-up Information     Follow up With Specialties Details Why Contact Info    Maurilio Love MD Orthopedic Surgery Schedule an appointment as soon as possible for a visit in 1 day Follow-up for further evaluation of  leg pain [id number] [facility]  [address]  [facility] [id number]  [phone]      Larisa Felder, NP Family Medicine Schedule an appointment as soon as possible for a visit in 1 day  [address]  [facility] [id number]  [phone]                                         Elin Carmona DO  10/13/20 0946

## 2021-01-18 ENCOUNTER — LAB VISIT (OUTPATIENT)
Dept: LAB | Facility: OTHER | Age: 66
End: 2021-01-18
Payer: MEDICARE

## 2021-01-18 DIAGNOSIS — Z20.822 ENCOUNTER FOR LABORATORY TESTING FOR COVID-19 VIRUS: ICD-10-CM

## 2021-01-18 PROCEDURE — U0003 INFECTIOUS AGENT DETECTION BY NUCLEIC ACID (DNA OR RNA); SEVERE ACUTE RESPIRATORY SYNDROME CORONAVIRUS 2 (SARS-COV-2) (CORONAVIRUS DISEASE [COVID-19]), AMPLIFIED PROBE TECHNIQUE, MAKING USE OF HIGH THROUGHPUT TECHNOLOGIES AS DESCRIBED BY CMS-2020-01-R: HCPCS

## 2021-01-20 LAB — SARS-COV-2 RNA RESP QL NAA+PROBE: NOT DETECTED

## 2021-03-17 ENCOUNTER — LAB VISIT (OUTPATIENT)
Dept: LAB | Facility: OTHER | Age: 66
End: 2021-03-17
Payer: MEDICARE

## 2021-03-17 DIAGNOSIS — Z20.822 ENCOUNTER FOR LABORATORY TESTING FOR COVID-19 VIRUS: ICD-10-CM

## 2021-03-17 PROCEDURE — U0003 INFECTIOUS AGENT DETECTION BY NUCLEIC ACID (DNA OR RNA); SEVERE ACUTE RESPIRATORY SYNDROME CORONAVIRUS 2 (SARS-COV-2) (CORONAVIRUS DISEASE [COVID-19]), AMPLIFIED PROBE TECHNIQUE, MAKING USE OF HIGH THROUGHPUT TECHNOLOGIES AS DESCRIBED BY CMS-2020-01-R: HCPCS | Performed by: NURSE PRACTITIONER

## 2021-03-18 LAB — SARS-COV-2 RNA RESP QL NAA+PROBE: NOT DETECTED
